# Patient Record
Sex: MALE | Race: WHITE | NOT HISPANIC OR LATINO | Employment: FULL TIME | URBAN - METROPOLITAN AREA
[De-identification: names, ages, dates, MRNs, and addresses within clinical notes are randomized per-mention and may not be internally consistent; named-entity substitution may affect disease eponyms.]

---

## 2019-03-12 ENCOUNTER — HOSPITAL ENCOUNTER (EMERGENCY)
Facility: CLINIC | Age: 39
Discharge: HOME | End: 2019-03-12
Attending: EMERGENCY MEDICINE | Admitting: EMERGENCY MEDICINE
Payer: COMMERCIAL

## 2019-03-12 ENCOUNTER — APPOINTMENT (OUTPATIENT)
Dept: CT IMAGING | Facility: CLINIC | Age: 39
End: 2019-03-12
Attending: EMERGENCY MEDICINE
Payer: COMMERCIAL

## 2019-03-12 VITALS
HEIGHT: 66 IN | WEIGHT: 135 LBS | OXYGEN SATURATION: 98 % | BODY MASS INDEX: 21.69 KG/M2 | SYSTOLIC BLOOD PRESSURE: 135 MMHG | DIASTOLIC BLOOD PRESSURE: 85 MMHG | TEMPERATURE: 98.6 F | HEART RATE: 66 BPM | RESPIRATION RATE: 16 BRPM

## 2019-03-12 DIAGNOSIS — R04.2 HEMOPTYSIS: ICD-10-CM

## 2019-03-12 LAB
ANION GAP SERPL CALCULATED.3IONS-SCNC: 7 MMOL/L (ref 3–14)
APTT PPP: 25 SEC (ref 22–37)
BASOPHILS # BLD AUTO: 0 10E9/L (ref 0–0.2)
BASOPHILS NFR BLD AUTO: 0.4 %
BUN SERPL-MCNC: 12 MG/DL (ref 7–30)
CALCIUM SERPL-MCNC: 9.2 MG/DL (ref 8.5–10.1)
CHLORIDE SERPL-SCNC: 104 MMOL/L (ref 94–109)
CO2 SERPL-SCNC: 26 MMOL/L (ref 20–32)
CREAT SERPL-MCNC: 0.91 MG/DL (ref 0.66–1.25)
CRP SERPL-MCNC: <2.9 MG/L (ref 0–8)
DIFFERENTIAL METHOD BLD: NORMAL
EOSINOPHIL # BLD AUTO: 0 10E9/L (ref 0–0.7)
EOSINOPHIL NFR BLD AUTO: 0.3 %
ERYTHROCYTE [DISTWIDTH] IN BLOOD BY AUTOMATED COUNT: 12.4 % (ref 10–15)
ERYTHROCYTE [SEDIMENTATION RATE] IN BLOOD BY WESTERGREN METHOD: 5 MM/H (ref 0–15)
GFR SERPL CREATININE-BSD FRML MDRD: >90 ML/MIN/{1.73_M2}
GLUCOSE SERPL-MCNC: 118 MG/DL (ref 70–99)
HCT VFR BLD AUTO: 48.3 % (ref 40–53)
HGB BLD-MCNC: 16 G/DL (ref 13.3–17.7)
IMM GRANULOCYTES # BLD: 0 10E9/L (ref 0–0.4)
IMM GRANULOCYTES NFR BLD: 0.1 %
INR PPP: 0.99 (ref 0.86–1.14)
LYMPHOCYTES # BLD AUTO: 1.3 10E9/L (ref 0.8–5.3)
LYMPHOCYTES NFR BLD AUTO: 16.5 %
MCH RBC QN AUTO: 30.5 PG (ref 26.5–33)
MCHC RBC AUTO-ENTMCNC: 33.1 G/DL (ref 31.5–36.5)
MCV RBC AUTO: 92 FL (ref 78–100)
MONOCYTES # BLD AUTO: 0.6 10E9/L (ref 0–1.3)
MONOCYTES NFR BLD AUTO: 7.9 %
NEUTROPHILS # BLD AUTO: 5.8 10E9/L (ref 1.6–8.3)
NEUTROPHILS NFR BLD AUTO: 74.8 %
NRBC # BLD AUTO: 0 10*3/UL
NRBC BLD AUTO-RTO: 0 /100
PLATELET # BLD AUTO: 254 10E9/L (ref 150–450)
POTASSIUM SERPL-SCNC: 3.9 MMOL/L (ref 3.4–5.3)
RBC # BLD AUTO: 5.25 10E12/L (ref 4.4–5.9)
SODIUM SERPL-SCNC: 137 MMOL/L (ref 133–144)
WBC # BLD AUTO: 7.7 10E9/L (ref 4–11)

## 2019-03-12 PROCEDURE — 85652 RBC SED RATE AUTOMATED: CPT | Performed by: EMERGENCY MEDICINE

## 2019-03-12 PROCEDURE — 25000128 H RX IP 250 OP 636: Performed by: EMERGENCY MEDICINE

## 2019-03-12 PROCEDURE — 99285 EMERGENCY DEPT VISIT HI MDM: CPT | Mod: 25

## 2019-03-12 PROCEDURE — 85610 PROTHROMBIN TIME: CPT | Performed by: EMERGENCY MEDICINE

## 2019-03-12 PROCEDURE — 85025 COMPLETE CBC W/AUTO DIFF WBC: CPT | Performed by: EMERGENCY MEDICINE

## 2019-03-12 PROCEDURE — 86140 C-REACTIVE PROTEIN: CPT | Performed by: EMERGENCY MEDICINE

## 2019-03-12 PROCEDURE — 71260 CT THORAX DX C+: CPT

## 2019-03-12 PROCEDURE — 80048 BASIC METABOLIC PNL TOTAL CA: CPT | Performed by: EMERGENCY MEDICINE

## 2019-03-12 PROCEDURE — 85730 THROMBOPLASTIN TIME PARTIAL: CPT | Performed by: EMERGENCY MEDICINE

## 2019-03-12 RX ORDER — IOPAMIDOL 755 MG/ML
500 INJECTION, SOLUTION INTRAVASCULAR ONCE
Status: COMPLETED | OUTPATIENT
Start: 2019-03-12 | End: 2019-03-12

## 2019-03-12 RX ADMIN — SODIUM CHLORIDE 74 ML: 9 INJECTION, SOLUTION INTRAVENOUS at 08:33

## 2019-03-12 RX ADMIN — IOPAMIDOL 57 ML: 755 INJECTION, SOLUTION INTRAVENOUS at 08:33

## 2019-03-12 ASSESSMENT — ENCOUNTER SYMPTOMS
RHINORRHEA: 0
FEVER: 0
SORE THROAT: 0
COUGH: 0

## 2019-03-12 ASSESSMENT — MIFFLIN-ST. JEOR: SCORE: 1465.11

## 2019-03-12 NOTE — ED TRIAGE NOTES
Patient presents with complaints of an episode of coughing up blood yesterday. Patient states he can feel the blood dripping down the back of this throat. Denies being ill recently.  ABC intact without need for intervention at this time.

## 2019-03-12 NOTE — ED PROVIDER NOTES
History     Chief Complaint:  Coughing up Blood    HPI   Leo Gabriel is an otherwise healthy 38 year old male who presents to the emergency department today for evaluation of hemoptysis. The patient reports he has been feeling at baseline throughout the day yesterday. While on his drive home from work, he coughed once and noticed non-mucousy dark bloody sputum. This has never happened to him before. After this cough, he started to experience light headedness and anxiety that he attributed to a panic attack due to the hemoptysis, but had no other symptoms. The hemoptysis then resolved throughout the night. 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 He has not run in high altitudes recently. This morning, he did notice mild blood from his right nare and feels as though he might be draining into his throat. He was concerned about the hemoptysis prompting his visit to the emergency department. He denies sinus congestion, cough, rhinorrhea, recent illness, previous lung or heart problems, history of blood clots, and family history of lung cancer.     Allergies:  Amoxicillin    Medications:    Allegra    Past Medical History:    History reviewed. No pertinent past medical history.    Past Surgical History:    History reviewed. No pertinent past surgical history.    Family History:    Hypertension    Social History:  The patient was accompanied to the ED by wife.  Smoking Status: Former, 1.00 ppd, 10 years, quit 2007  Smokeless Tobacco: Never  Alcohol Use: Yes, 3-6 cans/week  Drug Use: No  Marital Status:      Review of Systems   Constitutional: Negative for fever.   HENT: Negative for congestion, rhinorrhea and sore throat.         Hemoptysis   Respiratory: Negative for cough.    All other systems reviewed and are negative.        Physical Exam     Patient Vitals for the past 24 hrs:   BP Temp Temp src Heart Rate Resp SpO2 Height Weight   03/12/19 0721 (!) 144/95 98.6  F (37  C) Oral 71 16 97 % 1.676  "m (5' 6\") 61.2 kg (135 lb)         Physical Exam  Gen: well appearing, in no acute distress  HEENT:  mmm, no rhinorrhea  Neck: supple, no abnormal swelling  Lungs:  CTAB,  no resp distress  CV: rrr, no m/r/g, ppi  Abd: soft, nontender, nondistended, no rebound/masses/guarding/hsm  Ext: no peripheral edema  Skin: warm, dry, well perfused, no rashes/bruising/lesions on exposed skin  Neuro: alert, MAEE, no gross motor or sensory deficits, gait stable  Psych: Normal mood, normal affect      Emergency Department Course   Imaging:  Radiology findings were communicated with the patient and family who voiced understanding of the findings.  CT Chest Pulmonary Embolism w Contrast  IMPRESSION:   1. No evidence for pulmonary embolism or thoracic aortic dissection.    2. Patchy groundglass opacity at both lung bases, mild on the right  and minimal on the left. These findings are nonspecific but could be  related to infection or pulmonary hemorrhage.  Report per radiology     Laboratory:  Laboratory findings were communicated with the patient and family who voiced understanding of the findings.  CBC: WNL. (WBC 7.7, HGB 16.0, )   BMP: glucose 118 (H) o/w WNL (Creatinine 0.91)  INR: 0.99  PTT: 25    Emergency Department Course:  Nursing notes and vitals reviewed.  IV was inserted and blood was drawn for laboratory testing, results above.  The patient was sent for a CT Chest Pulmonary Embolism w Contrast while in the emergency department, results above.   0711: I performed an exam of the patient as documented above.   0914: Patient rechecked and updated.   0918: I spoke with Dr. Bennett of the pulmonology service from [facility] regarding patient's presentation, findings, and plan of care.  Findings and plan explained to the Patient and spouse. Patient discharged home with instructions regarding supportive care, medications, and reasons to return. The importance of close follow-up was reviewed.  I personally " reviewed the laboratory and imaging results with the Patient and spouse and answered all related questions prior to discharge.    Impression & Plan    Medical Decision Making:  Healthy 38 y M with no concerning cardiopulmonary hx here with hemoptysis.  Ddx: infectious, malignancy, pulm embolism, vasculitis vs blood from upper resp tract source.      CT showing groundglass opacities. Remainder of his blood work was unremarkable. Spoke with pulmonology at the [facility] about the differential and further work up for hemoptysis in the setting of groundglass opacities. He has no environmental exposures. He is not a drug user or huffer. He is not telling a convincing infectious or rheumatologic/vasculitis story. Given that he is clinically stable, does not have recurrent hemoptysis here in the emergency department, plan will be to discharge home alone to declare himself clinically to follow up with pulmonology as well have a Ct scan in a month to re-evaluate the groundglass opacities and return if new or worsening symptoms. Recommended that he go to the [facility] where they have pulmonology and can do bronchoscopy if further symptoms.     Diagnosis:    ICD-10-CM    1. Hemoptysis R04.2        Disposition:  discharged to home    Scribe Disclosure:  Everton VARELA, am serving as a scribe at 7:11 AM on 3/12/2019 to document services personally performed by Akbar Machado MD based on my observations and the provider's statements to me.     3/12/2019   [facility] EMERGENCY DEPARTMENT       Akbar Machado MD  03/12/19 1825

## 2019-03-12 NOTE — ED AVS SNAPSHOT
[facility] Emergency Department  [address]  [facility] [address]  Phone:  [phone]  Fax:  [phone]                                    Leo Gabriel   MRN: [id number]    Department:  [facility] Emergency Department   Date of Visit:  3/12/2019           After Visit Summary Signature Page    I have received my discharge instructions, and my questions have been answered. I have discussed any challenges I see with this plan with the nurse or doctor.    ..........................................................................................................................................  Patient/Patient Representative Signature      ..........................................................................................................................................  Patient Representative Print Name and Relationship to Patient    ..................................................               ................................................  Date                                   Time    ..........................................................................................................................................  Reviewed by Signature/Title    ...................................................              ..............................................  Date                                               Time          22EPIC Rev 08/18

## 2019-03-12 NOTE — DISCHARGE INSTRUCTIONS
You should have a repeat CT in one month with your primary care doctor    Return to ER ([facility]) immediately if you develop: worsening symptoms, Fever > 101, persistent nausea or vomiting OR you have any other concerns about your health.

## 2020-03-02 ENCOUNTER — HOSPITAL ENCOUNTER (EMERGENCY)
Facility: CLINIC | Age: 40
Discharge: HOME | End: 2020-03-03
Attending: PHYSICIAN ASSISTANT | Admitting: PHYSICIAN ASSISTANT
Payer: COMMERCIAL

## 2020-03-02 ENCOUNTER — APPOINTMENT (OUTPATIENT)
Dept: GENERAL RADIOLOGY | Facility: CLINIC | Age: 40
End: 2020-03-02
Attending: PHYSICIAN ASSISTANT
Payer: COMMERCIAL

## 2020-03-02 DIAGNOSIS — J18.9 PNEUMONIA OF RIGHT LUNG DUE TO INFECTIOUS ORGANISM, UNSPECIFIED PART OF LUNG: ICD-10-CM

## 2020-03-02 DIAGNOSIS — R04.2 HEMOPTYSIS: ICD-10-CM

## 2020-03-02 LAB
APTT PPP: 26 SEC (ref 22–37)
BASOPHILS # BLD AUTO: 0.1 10E9/L (ref 0–0.2)
BASOPHILS NFR BLD AUTO: 1.2 %
D DIMER PPP FEU-MCNC: <0.3 UG/ML FEU (ref 0–0.5)
DIFFERENTIAL METHOD BLD: NORMAL
EOSINOPHIL # BLD AUTO: 0.4 10E9/L (ref 0–0.7)
EOSINOPHIL NFR BLD AUTO: 5.2 %
ERYTHROCYTE [DISTWIDTH] IN BLOOD BY AUTOMATED COUNT: 12.1 % (ref 10–15)
HCT VFR BLD AUTO: 48 % (ref 40–53)
HGB BLD-MCNC: 15.7 G/DL (ref 13.3–17.7)
IMM GRANULOCYTES # BLD: 0 10E9/L (ref 0–0.4)
IMM GRANULOCYTES NFR BLD: 0.3 %
INR PPP: 0.98 (ref 0.86–1.14)
LYMPHOCYTES # BLD AUTO: 2 10E9/L (ref 0.8–5.3)
LYMPHOCYTES NFR BLD AUTO: 28.2 %
MCH RBC QN AUTO: 30.7 PG (ref 26.5–33)
MCHC RBC AUTO-ENTMCNC: 32.7 G/DL (ref 31.5–36.5)
MCV RBC AUTO: 94 FL (ref 78–100)
MONOCYTES # BLD AUTO: 0.6 10E9/L (ref 0–1.3)
MONOCYTES NFR BLD AUTO: 8.9 %
NEUTROPHILS # BLD AUTO: 3.9 10E9/L (ref 1.6–8.3)
NEUTROPHILS NFR BLD AUTO: 56.2 %
NRBC # BLD AUTO: 0 10*3/UL
NRBC BLD AUTO-RTO: 0 /100
PLATELET # BLD AUTO: 264 10E9/L (ref 150–450)
RBC # BLD AUTO: 5.12 10E12/L (ref 4.4–5.9)
WBC # BLD AUTO: 6.9 10E9/L (ref 4–11)

## 2020-03-02 PROCEDURE — 85610 PROTHROMBIN TIME: CPT | Performed by: PHYSICIAN ASSISTANT

## 2020-03-02 PROCEDURE — 80053 COMPREHEN METABOLIC PANEL: CPT | Performed by: PHYSICIAN ASSISTANT

## 2020-03-02 PROCEDURE — 85379 FIBRIN DEGRADATION QUANT: CPT | Performed by: PHYSICIAN ASSISTANT

## 2020-03-02 PROCEDURE — 85025 COMPLETE CBC W/AUTO DIFF WBC: CPT | Performed by: PHYSICIAN ASSISTANT

## 2020-03-02 PROCEDURE — 93005 ELECTROCARDIOGRAM TRACING: CPT

## 2020-03-02 PROCEDURE — 99285 EMERGENCY DEPT VISIT HI MDM: CPT | Mod: 25

## 2020-03-02 PROCEDURE — 85730 THROMBOPLASTIN TIME PARTIAL: CPT | Performed by: PHYSICIAN ASSISTANT

## 2020-03-02 PROCEDURE — 71046 X-RAY EXAM CHEST 2 VIEWS: CPT

## 2020-03-02 ASSESSMENT — ENCOUNTER SYMPTOMS
COUGH: 1
SORE THROAT: 1
FEVER: 0
SHORTNESS OF BREATH: 1
UNEXPECTED WEIGHT CHANGE: 0
DIAPHORESIS: 0
CHEST TIGHTNESS: 1

## 2020-03-02 NOTE — ED AVS SNAPSHOT
[facility] Emergency Department  [address]  [facility] [address]  Phone:  [phone]  Fax:  [phone]                                    Leo Gabriel   MRN: [id number]    Department:  [facility] Emergency Department   Date of Visit:  3/2/2020           After Visit Summary Signature Page    I have received my discharge instructions, and my questions have been answered. I have discussed any challenges I see with this plan with the nurse or doctor.    ..........................................................................................................................................  Patient/Patient Representative Signature      ..........................................................................................................................................  Patient Representative Print Name and Relationship to Patient    ..................................................               ................................................  Date                                   Time    ..........................................................................................................................................  Reviewed by Signature/Title    ...................................................              ..............................................  Date                                               Time          22EPIC Rev 08/18

## 2020-03-02 NOTE — LETTER
March 3, 2020      To Whom It May Concern:      Leo Gabriel was seen in our Emergency Department today, 03/03/20.  I expect his condition to improve over the next 2 days.  He may return to work/school when improved.    Sincerely,        Bipin Jade RN

## 2020-03-03 VITALS
SYSTOLIC BLOOD PRESSURE: 122 MMHG | HEART RATE: 62 BPM | RESPIRATION RATE: 16 BRPM | OXYGEN SATURATION: 96 % | DIASTOLIC BLOOD PRESSURE: 85 MMHG

## 2020-03-03 LAB
ALBUMIN SERPL-MCNC: 4.1 G/DL (ref 3.4–5)
ALP SERPL-CCNC: 64 U/L (ref 40–150)
ALT SERPL W P-5'-P-CCNC: 25 U/L (ref 0–70)
ANION GAP SERPL CALCULATED.3IONS-SCNC: 6 MMOL/L (ref 3–14)
AST SERPL W P-5'-P-CCNC: 21 U/L (ref 0–45)
BILIRUB SERPL-MCNC: 0.7 MG/DL (ref 0.2–1.3)
BUN SERPL-MCNC: 13 MG/DL (ref 7–30)
CALCIUM SERPL-MCNC: 9.1 MG/DL (ref 8.5–10.1)
CHLORIDE SERPL-SCNC: 104 MMOL/L (ref 94–109)
CO2 SERPL-SCNC: 26 MMOL/L (ref 20–32)
CREAT SERPL-MCNC: 0.98 MG/DL (ref 0.66–1.25)
GFR SERPL CREATININE-BSD FRML MDRD: >90 ML/MIN/{1.73_M2}
GLUCOSE SERPL-MCNC: 108 MG/DL (ref 70–99)
INTERPRETATION ECG - MUSE: NORMAL
POTASSIUM SERPL-SCNC: 3.6 MMOL/L (ref 3.4–5.3)
PROT SERPL-MCNC: 8 G/DL (ref 6.8–8.8)
SODIUM SERPL-SCNC: 136 MMOL/L (ref 133–144)

## 2020-03-03 PROCEDURE — 25000132 ZZH RX MED GY IP 250 OP 250 PS 637: Performed by: PHYSICIAN ASSISTANT

## 2020-03-03 RX ORDER — DOXYCYCLINE 100 MG/1
100 CAPSULE ORAL 2 TIMES DAILY
Qty: 15 CAPSULE | Refills: 0 | Status: SHIPPED | OUTPATIENT
Start: 2020-03-03 | End: 2020-04-16

## 2020-03-03 RX ORDER — DOXYCYCLINE 100 MG/1
100 CAPSULE ORAL ONCE
Status: COMPLETED | OUTPATIENT
Start: 2020-03-03 | End: 2020-03-03

## 2020-03-03 RX ADMIN — DOXYCYCLINE HYCLATE 100 MG: 100 CAPSULE ORAL at 01:36

## 2020-03-03 NOTE — ED PROVIDER NOTES
History     Chief Complaint:  Hemoptysis     HPI   Leo Gabriel is am otherwise healthy 39 year old male who presents to the emergency department for evaluation of hemoptysis. The patient reports about a year ago he had an episode of coughing up blood and was told to follow up with a pulmonologist if it happened again, but it did not reoccur so he did not follow up. He states about a month ago he had another episode of coughing up blood, and he saw an ENT and scheduled an appointment with pulmonology for this Wednesday. The patient indicates a similar episode of coughing up blood began on Saturday and has persisted intermittently, prompting him to present to the ED today. He describes the blood as dark red.  He reports it feels like blood may be running down the back of his throat as he also has a sore throat. He indicates he feels short of breath, which worsens at night, as well as occasional chest tightness. He denies chest pain, leg pain or swelling, fever, night sweats, and unexpected weight loss. The patient states he did vape prior to the first episode of coughing up blood a month ago, but he has not vaped since. He denies taking blood thinners. He further denies having ever travelled out of the [place]. He states he does not have a history of cancer or blood clots. The patient denies recent hospitalization, surgery, or other immobilization.  He is not on any immunosupressive medications.    Allergies:  Amoxicillin      Medications:    The patient is currently on no regular medications.     Past Medical History:    The patient denies any significant past medical history.     Past Surgical History:    Hernia repair     Family History:    HTN    Social History:  Presents alone.  Former smoker, 1 ppd, 10 pack years. Quit 5/1/2007.   Positive for alcohol use.    Positive for marijuana use.   Marital Status:   [2]     Review of Systems   Constitutional: Negative for diaphoresis, fever and unexpected weight  change.   HENT: Positive for sore throat.    Respiratory: Positive for cough (hemoptysis), chest tightness and shortness of breath.    Cardiovascular: Negative for chest pain and leg swelling.   All other systems reviewed and are negative.    Physical Exam     Patient Vitals for the past 24 hrs:   BP Pulse Heart Rate Resp SpO2   03/03/20 0045 -- -- -- -- 98 %   03/03/20 0030 (!) 124/90 57 -- -- 96 %   03/03/20 0010 -- -- -- -- 98 %   03/03/20 0000 129/83 69 -- -- 97 %   03/02/20 2340 (!) 140/95 -- -- -- 99 %   03/02/20 2335 (!) 140/95 65 -- -- 97 %   03/02/20 2203 (!) 158/108 -- 95 16 94 %      Physical Exam  General: Alert and cooperative with exam. Resting comfortably on gurney  Head:  Scalp is NC/AT  Eyes:  No scleral icterus, PERRL  ENT:  The external nose and ears are normal.     The oropharynx is normal and without erythema or tonsillar swelling. Uvula midline, no submandibular swelling, sublingual swelling, trismus.  No blood  Neck:  Normal range of motion without rigidity.  CV:  Regular rate and rhythm    No pathologic murmur, rubs, or gallops.  Resp:  Right basilar crackles.  No wheezes or rhonchi.  Non-labored, no retractions or accessory muscle use  GI:  Abdomen is soft, no distension, no tenderness, no masses. No peritoneal signs.  Bowel sounds present in all quadrants  MS:  No lower extremity edema or asymmetric calf swelling.  Skin:  Warm and dry, No rash or lesions noted.  Neuro: Oriented. No gross motor deficits.    Cranial nerves 2-12 intact. GCS: 15.  Psych: Awake. Alert. Normal affect. Appropriate interactions.     Emergency Department Course     ECG:  Time: 2317  Vent. Rate 61 bpm. NY interval 160. QRS duration 84. QT/QTc 396/398. P-R-T axis 34 49 46.  Normal sinus rhythm.  Normal ECG.  Read by Dr. Pinto at 2330     Imaging:  Radiographic findings were communicated with the patient who voiced understanding of the findings.    Chest XR, PA & LAT:  Heart is normal in size. Airspace opacification  right middle and right lower lobe. Findings suspicious for pneumonia. Minimal atelectasis left lower lung. Upper lungs clear. No pleural fluid. Mild hypertrophic change both AC joints.  As per radiology.    Laboratory:  CBC: WBC: 6.9, HGB: 15.7, PLT: 264     CMP: Glucose 108 (H), o/w WNL (Creatinine: 0.98)     D-dimer: <0.3    INR: 0.98    PTT: 26    Interventions:  0136 Doxycyline 100 mg per capsule 1 capsule PO    Emergency Department Course:  Past medical records, nursing notes, and vitals reviewed.    2220 I performed an exam of the patient as documented above.     IV was inserted and blood was drawn for laboratory testing, results above. Medication administered as noted above.     2317 EKG obtained in the ED, see results above.      The patient was sent for a Chest XR while in the emergency department, results above.     0110 I rechecked the patient and discussed the results of his workup thus far.     Findings and plan explained to the Patient. Patient discharged home with instructions regarding supportive care, medications, and reasons to return. The importance of close follow-up was reviewed. The patient was prescribed doxycycline.     I personally reviewed the laboratory results with the Patient and answered all related questions prior to discharge.      Impression & Plan      Medical Decision Makin year old male presents with hemoptysis.  He had a similar episode about 1 year ago which showed non-specific findings and advised to follow-up with pulmonology if recurred.  He has an appointment scheduled for 2 days from now.  Workup here shows right mid and lower opacities suspicious for pneumonia.  No indication for hospitalization by PSI score, no risk factors for resistent strains.  Will cover with doxycycline.  Labwork unremarkable with normal white count, hgb, platelets, coags.  He is vitally stable and no indication for hospitalization, transfusion or emergent bronchoscopy.  No further episodes of  hemoptysis here.  EKG normal.  Considered PE and D-dimer negative with no other risk factors.  No risk factors for TB and seems less likely given x-ray findings.  He already has follow-up scheduled in 2 days with pulmonolgy and urged him to keep this given the recurrent nature of this episode.  Strict return precautions given for new or worsening symptoms, increased hemoptysis, chest pain, shortness of breath, high fevers, etc.    Diagnosis:    ICD-10-CM   1. Pneumonia of right lung due to infectious organism, unspecified part of lung J18.9   2. Hemoptysis R04.2     Disposition:  discharged to home    Discharge Medications:  New Prescriptions    DOXYCYCLINE HYCLATE (VIBRAMYCIN) 100 MG CAPSULE    Take 1 capsule (100 mg) by mouth 2 times daily for 8 days     Scribe Disclosure:  IDestiny, am serving as a scribe on 3/2/2020 at 10:11 PM to personally document services performed by Norman Pearson PA-C, based on my observations and the provider's statements to me.      Destiny Foreman  3/2/2020   [facility] EMERGENCY DEPARTMENT       Norman Pearson PA-C  03/03/20 9124

## 2020-03-03 NOTE — ED TRIAGE NOTES
Pt here with c/o cough up blood since Sat. Similar epsidoe happened about year ago. Saw ENT, told to follow up with pulmonology if happens again. Has appointment with pulmonology on Wednesday. But now feeling SOB, worse at night. No chills/fevers. No body aches or weight loss. No recent travel. Not on blood thinners. ABC intact.

## 2020-04-16 ENCOUNTER — VIRTUAL VISIT (OUTPATIENT)
Dept: FAMILY MEDICINE | Facility: CLINIC | Age: 40
End: 2020-04-16
Payer: COMMERCIAL

## 2020-04-16 ENCOUNTER — ANCILLARY PROCEDURE (OUTPATIENT)
Dept: GENERAL RADIOLOGY | Facility: CLINIC | Age: 40
End: 2020-04-16
Attending: PHYSICIAN ASSISTANT
Payer: COMMERCIAL

## 2020-04-16 ENCOUNTER — OFFICE VISIT (OUTPATIENT)
Dept: FAMILY MEDICINE | Facility: CLINIC | Age: 40
End: 2020-04-16
Payer: COMMERCIAL

## 2020-04-16 VITALS
SYSTOLIC BLOOD PRESSURE: 130 MMHG | HEART RATE: 76 BPM | DIASTOLIC BLOOD PRESSURE: 75 MMHG | TEMPERATURE: 99.1 F | OXYGEN SATURATION: 97 %

## 2020-04-16 DIAGNOSIS — R04.2 HEMOPTYSIS: ICD-10-CM

## 2020-04-16 DIAGNOSIS — R05.9 COUGH: ICD-10-CM

## 2020-04-16 DIAGNOSIS — R04.2 HEMOPTYSIS: Primary | ICD-10-CM

## 2020-04-16 DIAGNOSIS — R05.9 COUGH: Primary | ICD-10-CM

## 2020-04-16 DIAGNOSIS — K21.9 GASTROESOPHAGEAL REFLUX DISEASE WITHOUT ESOPHAGITIS: ICD-10-CM

## 2020-04-16 PROCEDURE — 71046 X-RAY EXAM CHEST 2 VIEWS: CPT

## 2020-04-16 PROCEDURE — 99204 OFFICE O/P NEW MOD 45 MIN: CPT | Performed by: PHYSICIAN ASSISTANT

## 2020-04-16 PROCEDURE — 99207 ZZC NO BILLABLE SERVICE THIS VISIT: CPT | Performed by: PHYSICIAN ASSISTANT

## 2020-04-16 NOTE — PROGRESS NOTES
Subjective     Leo Gabriel is a 39 year old male who presents to clinic today for the following health issues:    HPI   Acute Illness   Acute illness concerns: Cough  Onset: Yesterday (but diagnosed with pneumonia previously about a month ago)    Fever: no    Chills/Sweats: no    Conjunctivitis:  no    Ear Pain: no    Rhinorrhea: no    Congestion: no    Sore Throat: YES- scratchy throat     Cough: YES - taste of blood occurred last night    Wheeze: no    Decreased Appetite: no    Nausea: no    Vomiting: no    Diarrhea:  no    Dysuria/Freq.: no    Fatigue/Achiness: YES- fatigue    Sick/Strep Exposure: no       Landon is a 39 year old male who presents today for evaluation of on going cough and hemoptysis. The patient had some hemoptysis in January and then again in February/March. That was when he was diagnosed with pneumonia. At that time he had fever, chills and cough. He was treated with doxycycline.    Patient used to smoke but quit in 2007.    He has some reflux. Not currently using anything for symptoms. He drinks coffee and 2 beers daily.    Patient has seen both ENT and pulmonology about this hemoptysis. ENT did a scope and did not see any cause for the blood. It was thought the blood could be due to the pneumonia when he saw pulmonology. At CT was performed by the pulmonologist and per patient did not show any changes compared to the x-ray done in the ER.    No current outpatient medications on file.     No current facility-administered medications for this visit.        Allergies   Allergen Reactions     Amoxicillin Difficulty breathing and Rash       Reviewed and updated as needed this visit by Provider  Tobacco  Allergies  Meds  Problems  Soc Hx        Review of Systems   GENERAL:  No fevers  RESP:  As noted in HPI        Objective    /75 (BP Location: Left arm, Patient Position: Sitting, Cuff Size: Adult Regular)   Pulse 76   Temp 99.1  F (37.3  C) (Oral)   SpO2 97%   There is no height or  weight on file to calculate BMI.  Physical Exam   GENERAL: No acute distress  HEENT: Normocephalic, PERRL, Posterior oropharynx non-erythematous and without exudate.  NECK: No cervical or supraclavicular lymphadenopathy.  CARDIAC: Regular rate and rhythm. No murmurs.  PULMONARY: Lungs are clear to auscultation bilaterally. No wheezes, rhonchi or crackles.  NEURO: Alert and non-focal    Diagnostic Test Results:  CHEST TWO VIEWS 4/16/2020 1:15 PM      HISTORY: Right middle and lower lobe pneumonia 3/2/20, cough  continues, recent worsening; Cough     COMPARISON: 3/2/2020                                                                       IMPRESSION: The previously identified right basilar opacity has  resolved. No new airspace opacities are identified. No pleural fluid  or pneumothorax. Normal size of the heart.        Assessment & Plan     1. Cough  Due to patient's ongoing cough as well as his hemoptysis chest x-ray was repeated today.  This shows a resolution of the pneumonia seen previously.  At this time the etiology of this red sputum and metallic taste is unclear.  It is possibly related to reflux which the patient does admit to having.  He has not treated this with any medications as of yet.  Patient has seen ENT as well as pulmonology without any obvious etiology for his bleeding other than the pneumonia that was diagnosed a month ago.  At this time referral to [facility] for endoscopy was placed.  Patient also placed on omeprazole as noted below.  We discussed other lifestyle changes to improve reflux including decreasing his coffee intake as well as decreasing his alcohol intake.  Patient does not routinely take NSAIDs.  - XR Chest 2 Views; Future    2. Gastroesophageal reflux disease without esophagitis  As noted above.  - GASTROENTEROLOGY ADULT REF PROCEDURE ONLY Other; [facility] [phone]; Future  - omeprazole (PRILOSEC) 20 MG DR capsule; Take 1 capsule (20 mg) by mouth daily  Dispense: 30 capsule;  Refill: 1    3. Hemoptysis  As noted above.  - GASTROENTEROLOGY ADULT REF PROCEDURE ONLY Other; [facility] [phone]; Future       See Patient Instructions    Return if symptoms worsen or fail to improve.    Tracy Hartmann PA-C  [facility]

## 2020-04-16 NOTE — PATIENT INSTRUCTIONS
Follow up with [facility].  Start the omeprazole.    Patient Education     GERD (Adult)  The esophagus is a tube that carries food from the mouth to the stomach. A valve (the LES, lower esophageal sphincter) at the lower end of the esophagus prevents stomach acid from flowing upward. When this valve doesn't work properly, stomach contents may repeatedly flow back up (reflux) into the esophagus. This is called gastroesophageal reflux disease (GERD). GERD can irritate the esophagus. It can cause problems with pain, swallowing or breathing. In severe cases, GERD can cause recurrent pneumonia (from aspiration or breathing in particles) or other serious problems.  Symptoms of reflux include burning, pressure or sharp pain in the upper abdomen or mid to lower chest. The pain can spread to the neck, back, or shoulder. There may be belching, an acid taste in the back of the throat, chronic cough, or sore throat, or hoarseness. GERD symptoms often occur during the day after a big meal. They can also occur at night when lying down.   Home care  Lifestyle changes can help reduce symptoms. If needed, your healthcare provider may prescribe medicines. Symptoms often improve with treatment, but if treatment is stopped, the symptoms often return after a few months. So most persons with GERD will need to continue treatment or get treatment on and off.  Lifestyle changes    Limit or avoid fatty, fried, and spicy foods, as well as coffee, chocolate, mint, and foods with high acid content such as tomatoes and citrus fruit and juices (orange, grapefruit, lemon).    Don t eat large meals, especially at night. Frequent, smaller meals are best. Don't lie down right after eating. And don t eat anything 3 hours before going to bed.    Don't drink alcohol or smoke. As much as possible, stay away from second hand smoke.    If you are overweight, losing weight will reduce symptoms.     Don't wear tight clothing around your stomach area.    If your  "symptoms occur during sleep, use a foam wedge to elevate your upper body (not just your head.) Or, place 4\" blocks under the head of your bed. Or use 2 bed risers under your bedframe.  Medicines  If needed, medicines can help relieve the symptoms of GERD and prevent damage to the esophagus. Discuss a medicine plan with your healthcare provider. This may include one or more of the following medicines:    Antacids to help neutralize the normal acids in your stomach.    Acid blockers (Histamine or H2 blockers) to decrease acid production.    Acid inhibitors (proton pump inhibitors PPIs) to decrease acid production in a different way than the blockers. They may work better, but can take a little longer to take effect.  Take an antacid 30 to 60 minutes after eating and at bedtime, but not at the same time as an acid blocker.  Try not to take medicines such as ibuprofen and aspirin. If you are taking aspirin for your heart or other medical reasons, talk to your healthcare provider about stopping it.  Follow-up care  Follow up with your healthcare provider or as advised by our staff.  When to seek medical advice  Call your healthcare provider if any of the following occur:    Stomach pain gets worse or moves to the lower right abdomen (appendix area)    Chest pain appears or gets worse, or spreads to the back, neck, shoulder, or arm    An over-the-counter trial of medicine doesn't relieve your symptoms    Weight loss that can't be explained    Trouble or pain swallowing    Frequent vomiting (can t keep down liquids)    Blood in the stool or vomit (red or black in color)    Feeling weak or dizzy    Fever of 100.4 F (38 C) or higher, or as directed by your healthcare provider  Date Last Reviewed: 3/1/2018    9474-8727 [employer]. [address] [place] [address]. All rights reserved. This information is not intended as a substitute for professional medical care. Always follow your healthcare " professional's instructions.

## 2020-04-16 NOTE — PROGRESS NOTES
"Leo Gabriel is a 39 year old male who is being evaluated via a billable video visit.      The patient has been notified of following:     \"This video visit will be conducted via a call between you and your physician/provider. We have found that certain health care needs can be provided without the need for an in-person physical exam.  This service lets us provide the care you need with a video conversation.  If a prescription is necessary we can send it directly to your pharmacy.  If lab work is needed we can place an order for that and you can then stop by our lab to have the test done at a later time.    Video visits are billed at different rates depending on your insurance coverage.  Please reach out to your insurance provider with any questions.    If during the course of the call the physician/provider feels a video visit is not appropriate, you will not be charged for this service.\"    Patient has given verbal consent for Video visit? Yes    How would you like to obtain your AVS? E-Mail (inform patient AVS not encrypted)    Patient would like the video invitation sent by: Text to cell phone: [phone]      Subjective     Leo Gabriel is a 39 year old male who presents to clinic today for the following health issues:    Acute Illness   Acute illness concerns: cough  Onset: last night    Fever: no    Chills/Sweats: no    Headache (location?): no    Sinus Pressure:no    Conjunctivitis:  no    Ear Pain: no    Rhinorrhea: no     Congestion: YES    Sore Throat: YES- drainage and itchy throat      Cough: YES-{cough up blood last night     Wheeze: no     Decreased Appetite: no    Nausea: no    Vomiting: no    Diarrhea:  no    Dysuria/Freq.: no    Fatigue/Achiness: no    Sick/Strep Exposure: no      Therapies Tried and outcome:        Video Start Time: 1117        Patient was at ER 1 month ago. Dx with pneumonia, given Rx for doxycycline and then saw Dr. Peters (pulmnology) 1 week later with a CT of the chest. " "Per patient, Dr. Peters \"didn't see anything new\". Patient states he did get Rx for prednisone taper from pulmonology. Patient states his symptoms improved and then ended up with dry cough, but over the past 24 hours coughed up\"what tasted like blood\" overnight. Patient having reflux daily and worried \"blood is coming from my GI tract\".     Reviewed and updated as needed this visit by Provider         Review of Systems   ROS COMP: Constitutional, HEENT, cardiovascular, pulmonary, gi and gu systems are negative, except as otherwise noted.      Objective    There were no vitals taken for this visit.  Estimated body mass index is 21.79 kg/m  as calculated from the following:    Height as of 3/12/19: 1.676 m (5' 6\").    Weight as of 3/12/19: 61.2 kg (135 lb).  Physical Exam   GENERAL: healthy, alert and no distress            Assessment & Plan     1. Hemoptysis  Will bring in to clinic for exam.   Approved by same day provider.              No follow-ups on file.    Yarelis Kline PA-C  [facility]      Video-Visit Details    Type of service:  Video Visit    Video End Time (time video stopped): 1128    Originating Location (pt. Location): Home    Distant Location (provider location):  [facility]     Mode of Communication:  Video Conference via [employer]    No follow-ups on file.       Yarelis Kline PA-C      "

## 2020-05-06 ENCOUNTER — TRANSFERRED RECORDS (OUTPATIENT)
Dept: HEALTH INFORMATION MANAGEMENT | Facility: CLINIC | Age: 40
End: 2020-05-06

## 2020-05-12 ENCOUNTER — TRANSFERRED RECORDS (OUTPATIENT)
Dept: HEALTH INFORMATION MANAGEMENT | Facility: CLINIC | Age: 40
End: 2020-05-12

## 2020-10-05 ENCOUNTER — VIRTUAL VISIT (OUTPATIENT)
Dept: FAMILY MEDICINE | Facility: CLINIC | Age: 40
End: 2020-10-05
Payer: COMMERCIAL

## 2020-10-05 DIAGNOSIS — J30.2 SEASONAL ALLERGIC RHINITIS, UNSPECIFIED TRIGGER: Primary | ICD-10-CM

## 2020-10-05 PROCEDURE — 99213 OFFICE O/P EST LOW 20 MIN: CPT | Mod: 95 | Performed by: FAMILY MEDICINE

## 2020-10-05 RX ORDER — MONTELUKAST SODIUM 10 MG/1
10 TABLET ORAL AT BEDTIME
Qty: 30 TABLET | Refills: 11 | Status: SHIPPED | OUTPATIENT
Start: 2020-10-05 | End: 2022-11-09

## 2020-10-05 NOTE — PROGRESS NOTES
"Leo Gabriel is a 40 year old male who is being evaluated via a billable video visit.      The patient has been notified of following:     \"This video visit will be conducted via a call between you and your physician/provider. We have found that certain health care needs can be provided without the need for an in-person physical exam.  This service lets us provide the care you need with a video conversation.  If a prescription is necessary we can send it directly to your pharmacy.  If lab work is needed we can place an order for that and you can then stop by our lab to have the test done at a later time.    Video visits are billed at different rates depending on your insurance coverage.  Please reach out to your insurance provider with any questions.    If during the course of the call the physician/provider feels a video visit is not appropriate, you will not be charged for this service.\"    Patient has given verbal consent for Video visit? Yes  How would you like to obtain your AVS? MyChart  If you are dropped from the video visit, the video invite should be resent to: Text to cell phone: [phone]  Will anyone else be joining your video visit? No    Subjective     Leo Gabriel is a 40 year old male who presents today via video visit for the following health issues:    HPI     ALLERGIES  Onset/Duration: 6 months  Symptoms:   Nasal congestion: no  Sneezing: YES  Red, itchy eyes: no  Progression of Symptoms: intermittent  Accompanying Signs & Symptoms:  Cough: YES- feels need to clear throat  Wheezing: no  Rash: no  Sinus/facial pain: no  History:   Is it seasonal: during any time of the year   History of Asthma: no  Has allergy testing been done: no  Precipitating factors:   None  Alleviating factors:  None  Therapies tried and outcome: OTC allergy medication, flonase    Video Start Time: 1:45 PM    Patient is seen for video visit to discuss treatment of seasonal nasal allergies.    He has tried multiple OTC " "oral antihistamine to include levocetirizine, and loratadine, without relief, and he got systemic hives using Flonase after 2 weeks.    He wakes in the morning with nasal congestion.  He has chronic throat irritation, and he does not think his throat irritation is related to GERD, having had an acid reflux workup.  He believes it is likely due to postnasal drainage.    No recent cough or shortness of breath.  No recent fever.    Nasal congestion is worse during non-Winter months.       Review of Systems   See history of present illness.      Objective    Vitals - Patient Reported  Weight (Patient Reported): 59 kg (130 lb)  Height (Patient Reported): 167.6 cm (5' 6\")  BMI (Based on Pt Reported Ht/Wt): 20.98  Pulse (Patient Reported): 68      Vitals:  No vitals were obtained today due to virtual visit.    Physical Exam     GENERAL: Healthy, alert and no distress  EYES: Eyes grossly normal to inspection.  No discharge or erythema, or obvious scleral/conjunctival abnormalities.  RESP: No audible wheeze, cough, or visible cyanosis.  No visible retractions or increased work of breathing.    SKIN: Visible skin clear. No significant rash, abnormal pigmentation or lesions.  NEURO: Cranial nerves grossly intact.  Mentation and speech appropriate for age.  PSYCH: Mentation appears normal, affect normal/bright, judgement and insight intact, normal speech and appearance well-groomed.    Patient was agreeable to my suggestion that we try treatment using montelukast.          Assessment & Plan     Seasonal allergic rhinitis, unspecified trigger    - montelukast (SINGULAIR) 10 MG tablet  Dispense: 30 tablet; Refill: 11          There are no Patient Instructions on file for this visit.    No follow-ups on file.    Michael James DO  [facility]      Video-Visit Details    Type of service:  Video Visit    Video End Time:1:55 PM    Originating Location (pt. Location): Home    Distant Location (provider " location):  [facility]     Platform used for Video Visit: [employer]

## 2020-10-06 ENCOUNTER — OFFICE VISIT (OUTPATIENT)
Dept: FAMILY MEDICINE | Facility: CLINIC | Age: 40
End: 2020-10-06
Payer: COMMERCIAL

## 2020-10-06 VITALS
TEMPERATURE: 98.2 F | DIASTOLIC BLOOD PRESSURE: 85 MMHG | WEIGHT: 134.3 LBS | SYSTOLIC BLOOD PRESSURE: 132 MMHG | HEART RATE: 57 BPM | BODY MASS INDEX: 21.68 KG/M2 | OXYGEN SATURATION: 99 %

## 2020-10-06 DIAGNOSIS — R05.9 COUGH: Primary | ICD-10-CM

## 2020-10-06 DIAGNOSIS — Z23 NEED FOR PROPHYLACTIC VACCINATION AND INOCULATION AGAINST INFLUENZA: ICD-10-CM

## 2020-10-06 DIAGNOSIS — R04.2 HEMOPTYSIS: ICD-10-CM

## 2020-10-06 PROCEDURE — 99213 OFFICE O/P EST LOW 20 MIN: CPT | Mod: 25 | Performed by: PHYSICIAN ASSISTANT

## 2020-10-06 PROCEDURE — 90471 IMMUNIZATION ADMIN: CPT | Performed by: PHYSICIAN ASSISTANT

## 2020-10-06 PROCEDURE — 90686 IIV4 VACC NO PRSV 0.5 ML IM: CPT | Performed by: PHYSICIAN ASSISTANT

## 2020-10-06 RX ORDER — IPRATROPIUM BROMIDE 21 UG/1
1-2 SPRAY, METERED NASAL 3 TIMES DAILY PRN
Qty: 30 ML | Refills: 3 | Status: SHIPPED | OUTPATIENT
Start: 2020-10-06 | End: 2022-11-09

## 2020-10-06 NOTE — PROGRESS NOTES
Subjective     Leo Gabriel is a 40 year old male who presents to clinic today for the following health issues:    HPI        ALLERGIES  Onset/Duration: 6 months  Symptoms:   Nasal congestion: no  Sneezing: YES  Red, itchy eyes: no  Progression of Symptoms: intermittent  Accompanying Signs & Symptoms:  Cough: YES- feels need to clear throat, noticed blood this  morning  Wheezing: no  Rash: no  Sinus/facial pain: no  History:   Is it seasonal: during any time of the year   History of Asthma: no  Has allergy testing been done: no  Precipitating factors:   None  Alleviating factors:  None  Therapies tried and outcome: OTC allergy medication, flonase     {SUPERLIST (Optional):582289}  {additonal problems for provider to add (Optional):324182}    Review of Systems   {ROS COMP (Optional):963262}      Objective    There were no vitals taken for this visit.  There is no height or weight on file to calculate BMI.  Physical Exam   {Exam List (Optional):070177}    {Diagnostic Test Results (Optional):962724}        {PROVIDER CHARTING PREFERENCE:463696}

## 2020-10-06 NOTE — PROGRESS NOTES
Subjective     Leo Gabriel is a 40 year old male who presents to clinic today for the following health issues:    URI             Acute Illness  Acute illness concerns: cough  Onset/Duration: 2 years  Symptoms:  Fever: no  Chills/Sweats: no  Headache (location?): no  Sinus Pressure: no  Conjunctivitis:  no  Ear Pain: no  Rhinorrhea: YES- sometimes  Congestion: no  Sore Throat: no  Cough: YES - throat clearing   Wheeze: no  Decreased Appetite: no  Nausea: no  Vomiting: no  Diarrhea: no  Dysuria/Freq.: no  Dysuria or Hematuria: no  Fatigue/Achiness: no  Sick/Strep Exposure: no  Therapies tried and outcome: benadryl, claritin, and other otc    Ongoing. Intermittent blood in sputum in AM. Present this morning. Concerned side effect of singulair.     Of note, has seen pulm , ENT, and GI in the past for etiology. No cause. Reported CT can in past normal. Patient attempted flonase in the past but stopped due to hives after 2 weeks.     Of note, patient states symptoms started shortly after taking a hit on a vape pen years ago. At pul, patient denies any pft testing. Past history of smoking, has since stopped.     No tb exposures. No weight loss or night sweats.     Review of Systems   Constitutional, HEENT, cardiovascular, pulmonary, GI, , musculoskeletal, neuro, skin, endocrine and psych systems are negative, except as otherwise noted.      Objective    /85 (BP Location: Right arm, Patient Position: Chair, Cuff Size: Adult Regular)   Pulse 57   Temp 98.2  F (36.8  C) (Oral)   Wt 60.9 kg (134 lb 4.8 oz)   SpO2 99%   BMI 21.68 kg/m    Body mass index is 21.68 kg/m .  Physical Exam   GENERAL: healthy, alert and no distress  EYES: Eyes grossly normal to inspection, PERRL and conjunctivae and sclerae normal  HENT: ear canals and TM's normal, nose and mouth without ulcers or lesions  RESP: lungs clear to auscultation - no rales, rhonchi or wheezes  CV: regular rates and rhythm, normal S1 S2, no S3 or S4 and no  murmur, click or rub  PSYCH: mentation appears normal, affect normal/bright        Assessment & Plan     Cough  Ongoing and chronic. Based on history and negative work up, sounds as post nasal drip. Allergies are possible. Recommending continuing singulair. However, given hemoptysis and occurrence after vaping, recommending follow up with pulm and possible pfts as well. atrovent prn to be attempted as well.   - ipratropium (ATROVENT) 0.03 % nasal spray; Spray 1-2 sprays into both nostrils 3 times daily as needed for rhinitis  - PULMONARY MEDICINE REFERRAL    Hemoptysis  As above       Need for prophylactic vaccination and inoculation against influenza    - INFLUENZA VACCINE IM > 6 MONTHS VALENT IIV4 [26734]  - Vaccine Administration, Initial [60891]          Return in about 6 months (around 4/6/2021) for Physical Exam, with pcp.    Maxime Rojo PA-C  [facility]

## 2020-10-06 NOTE — PATIENT INSTRUCTIONS
Patient Education     Chronic Cough with Uncertain Cause (Adult)    Everyone has had a cough as part of the common cold, flu, or bronchitis. This kind of cough occurs along with an achy feeling, low-grade fever, nasal and sinus congestion, and a scratchy or sore throat. This usually gets better in 2 to 3 weeks. A cough that lasts longer than 3 weeks may be due to other causes. Your healthcare provider may refer to this as a chronic cough.  If your cough does not improve over the next 2 weeks, further testing may be needed. Follow up with your healthcare provider as advised. Cough suppressants may be recommended. Based on your exam today, the exact cause of your cough is not certain. Below are some common causes for persistent cough.  Smoker's cough  Smoker s cough doesn t go away. If you continue to smoke, it only gets worse. The cough is from irritation in the air passages. Talk to your healthcare provider about quitting. Medicines or nicotine-replacement products, like gum or the patch, may make quitting easier.  Postnasal drip  A cough that is worse at night may be due to postnasal drip. Excess mucus in the nose drains from the back of your nose to your throat. This triggers the cough reflex. Postnasal drip may be due to a sinus infection or allergy. Common allergens include dust, tobacco smoke (both inhaled and secondhand smoke), environmental pollutants, pollen, mold, pets, cleaning agents, room deodorizers, and chemical fumes. Over-the-counter antihistamines or decongestants may be helpful for allergies. A sinus infection may requires antibiotic treatment. See your healthcare provider if symptoms continue.  Medicines  Certain prescribed medicines can cause a chronic cough in some people:    ACE inhibitors for high blood pressure. These include benazepril, captopril, enalapril, fosinopril, lisinopril, quinapril, ramipril, and others.    Beta-blockers for high blood pressure and other conditions. These include  propranolol, atenolol, metoprolol, nadolol, and others.  Let your healthcare provider know if you are taking any of these. The chronic cough may mean your medicine needs to be changed.  Asthma  Cough may be the only sign of mild asthma. You may have tests to find out if asthma is causing your cough. You may also take asthma medicine on a trial basis.  Acid reflux (heartburn, GERD)  The esophagus is the tube that carries food from the mouth to the stomach. A valve at its lower end prevents stomach acids from flowing upward. If this valve does not work properly, acid from the stomach enters the esophagus. This may cause a burning pain in the upper abdomen or lower chest, belching, or cough. Symptoms are often worse when lying flat. Avoid eating or drinking before bedtime. Try using extra pillows to raise your upper body, or place 4-inch blocks under the head of your bed. You may try an over-the-counter (OTC) antacid or an acid-blocking medicine such as famotidine, cimetidine, ranitidine, esomeprazole, lansoprazole, or omeprazole. Stronger medicines for this condition can be prescribed by your healthcare provider. Ask your healthcare provider which OTC medicine to use. Depending on your current medicines, some OTC medicines may cause drug interactions and should be avoided.  Follow-up care  Follow up with your healthcare provider, or as advised, if your cough does not improve. Further testing may be needed.  Note: If an X-ray was taken, a specialist will review it. You will be notified of any new findings that may affect your care.  When to seek medical advice  Call your healthcare provider right away if any of these occur:    Mild wheezing or difficulty breathing    Fever of 100.4 F (38 C) or higher, or as directed by your healthcare provider    Unexpected weight loss    Coughing up large amounts of colored sputum or blood-tinged sputum    Night sweats (sheets and pajamas get soaking wet)  Call 911  Call 911 if any of  these occur:    Coughing up blood    Moderate to severe trouble breathing or wheezing  Date Last Reviewed: 6/1/2018 2000-2019 [employer]. [address] [place] [address], [place] [address]. All rights reserved. This information is not intended as a substitute for professional medical care. Always follow your healthcare professional's instructions.

## 2020-10-07 ENCOUNTER — HOSPITAL ENCOUNTER (EMERGENCY)
Facility: CLINIC | Age: 40
Discharge: HOME | End: 2020-10-07
Attending: EMERGENCY MEDICINE | Admitting: EMERGENCY MEDICINE
Payer: COMMERCIAL

## 2020-10-07 ENCOUNTER — APPOINTMENT (OUTPATIENT)
Dept: GENERAL RADIOLOGY | Facility: CLINIC | Age: 40
End: 2020-10-07
Attending: EMERGENCY MEDICINE
Payer: COMMERCIAL

## 2020-10-07 VITALS
RESPIRATION RATE: 20 BRPM | SYSTOLIC BLOOD PRESSURE: 145 MMHG | OXYGEN SATURATION: 100 % | WEIGHT: 135 LBS | TEMPERATURE: 98.9 F | DIASTOLIC BLOOD PRESSURE: 107 MMHG | BODY MASS INDEX: 21.79 KG/M2 | HEART RATE: 59 BPM

## 2020-10-07 DIAGNOSIS — R04.2 HEMOPTYSIS: ICD-10-CM

## 2020-10-07 LAB
ALBUMIN SERPL-MCNC: 3.8 G/DL (ref 3.4–5)
ALP SERPL-CCNC: 57 U/L (ref 40–150)
ALT SERPL W P-5'-P-CCNC: 21 U/L (ref 0–70)
ANION GAP SERPL CALCULATED.3IONS-SCNC: 5 MMOL/L (ref 3–14)
AST SERPL W P-5'-P-CCNC: 17 U/L (ref 0–45)
BASOPHILS # BLD AUTO: 0.1 10E9/L (ref 0–0.2)
BASOPHILS NFR BLD AUTO: 1 %
BILIRUB SERPL-MCNC: 0.4 MG/DL (ref 0.2–1.3)
BUN SERPL-MCNC: 10 MG/DL (ref 7–30)
CALCIUM SERPL-MCNC: 8.7 MG/DL (ref 8.5–10.1)
CHLORIDE SERPL-SCNC: 108 MMOL/L (ref 94–109)
CO2 SERPL-SCNC: 28 MMOL/L (ref 20–32)
CREAT SERPL-MCNC: 1.05 MG/DL (ref 0.66–1.25)
DIFFERENTIAL METHOD BLD: NORMAL
EOSINOPHIL # BLD AUTO: 0.1 10E9/L (ref 0–0.7)
EOSINOPHIL NFR BLD AUTO: 1.2 %
ERYTHROCYTE [DISTWIDTH] IN BLOOD BY AUTOMATED COUNT: 12.1 % (ref 10–15)
GFR SERPL CREATININE-BSD FRML MDRD: 87 ML/MIN/{1.73_M2}
GLUCOSE SERPL-MCNC: 95 MG/DL (ref 70–99)
HCT VFR BLD AUTO: 46.6 % (ref 40–53)
HGB BLD-MCNC: 14.9 G/DL (ref 13.3–17.7)
IMM GRANULOCYTES # BLD: 0 10E9/L (ref 0–0.4)
IMM GRANULOCYTES NFR BLD: 0.2 %
INR PPP: 0.94 (ref 0.86–1.14)
LYMPHOCYTES # BLD AUTO: 1.2 10E9/L (ref 0.8–5.3)
LYMPHOCYTES NFR BLD AUTO: 25.5 %
MCH RBC QN AUTO: 30 PG (ref 26.5–33)
MCHC RBC AUTO-ENTMCNC: 32 G/DL (ref 31.5–36.5)
MCV RBC AUTO: 94 FL (ref 78–100)
MONOCYTES # BLD AUTO: 0.5 10E9/L (ref 0–1.3)
MONOCYTES NFR BLD AUTO: 10.7 %
NEUTROPHILS # BLD AUTO: 3 10E9/L (ref 1.6–8.3)
NEUTROPHILS NFR BLD AUTO: 61.4 %
NRBC # BLD AUTO: 0 10*3/UL
NRBC BLD AUTO-RTO: 0 /100
PLATELET # BLD AUTO: 205 10E9/L (ref 150–450)
POTASSIUM SERPL-SCNC: 3.5 MMOL/L (ref 3.4–5.3)
PROT SERPL-MCNC: 7.1 G/DL (ref 6.8–8.8)
RBC # BLD AUTO: 4.96 10E12/L (ref 4.4–5.9)
SODIUM SERPL-SCNC: 141 MMOL/L (ref 133–144)
WBC # BLD AUTO: 4.9 10E9/L (ref 4–11)

## 2020-10-07 PROCEDURE — 80053 COMPREHEN METABOLIC PANEL: CPT | Performed by: EMERGENCY MEDICINE

## 2020-10-07 PROCEDURE — 85025 COMPLETE CBC W/AUTO DIFF WBC: CPT | Performed by: EMERGENCY MEDICINE

## 2020-10-07 PROCEDURE — 99284 EMERGENCY DEPT VISIT MOD MDM: CPT | Mod: 25

## 2020-10-07 PROCEDURE — 71046 X-RAY EXAM CHEST 2 VIEWS: CPT

## 2020-10-07 PROCEDURE — 85610 PROTHROMBIN TIME: CPT | Performed by: EMERGENCY MEDICINE

## 2020-10-07 NOTE — ED AVS SNAPSHOT
[facility]  [address]  [facility] [address]  Phone: [phone]  Fax: [phone]                                    Leo Gabriel   MRN: [id number]    Department: [facility]   Date of Visit: 10/7/2020           After Visit Summary Signature Page    I have received my discharge instructions, and my questions have been answered. I have discussed any challenges I see with this plan with the nurse or doctor.    ..........................................................................................................................................  Patient/Patient Representative Signature      ..........................................................................................................................................  Patient Representative Print Name and Relationship to Patient    ..................................................               ................................................  Date                                   Time    ..........................................................................................................................................  Reviewed by Signature/Title    ...................................................              ..............................................  Date                                               Time          22EPIC Rev 08/18

## 2020-10-08 NOTE — ED PROVIDER NOTES
History     Chief Complaint:  Hemoptysis    HPI   Leo Gabriel is a 40 year old male who presents with 3 episodes of hemoptysis today.  The patient reports at least 2 years of ongoing intermittent episodes of hemoptysis which she has had evaluation of by gastroenterology with EGD as well as ENT with reported direct visualization of his pharynx and larynx.  He reports that when these episodes occur he gets congestion in his chest and throat and a stinging burning sensation in the back of his throat and then he begins to cough up small amounts of bright red blood.  He reports that occasionally if they occur while he is sleeping he will cough up a larger blood clot.  He states today when he coughs into a tissue the blood is about the size of a silver dollar.  He denies any significant chest pain or shortness of breath.  No dizziness lightheadedness, nausea, vomiting, fever.  No pleuritic pain.  He denies bleeding elsewhere.  No blood in the stool or urine.  No melena.  No easy bruising or bleeding.  No mucosal or gingival bleeding.  He reports he otherwise feels completely normal.  He denies any other symptoms.  He does not take aspirin or anticoagulation.  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  He notes that the episodes of hemoptysis typically occur mostly in the colder months.  And he did not have any over the summer.    Allergies:  Amoxicillin       Medications:    Singulair     Past Medical History:    The patient does not have any past pertinent medical history.     Past Surgical History:    Hernia repair     Family History:    Hypertension     Social History:  Smoking status- former smoker  Alcohol use- yes  Drug use- yes - marijuana   Marital Status:       Review of Systems   Constitutional: Negative for activity change and appetite change.   HENT: Negative for congestion, dental problem, ear pain, facial swelling, mouth sores,  nosebleeds, sinus pain, trouble swallowing and voice change.    Respiratory: Positive for cough. Negative for shortness of breath.    Cardiovascular: Negative for chest pain.   Gastrointestinal: Negative for abdominal pain, blood in stool, diarrhea and nausea.   Genitourinary: Negative for difficulty urinating and flank pain.   Skin: Negative for rash and wound.   Neurological: Negative for dizziness, syncope, light-headedness and headaches.   Psychiatric/Behavioral: Negative for confusion.   All other systems reviewed and are negative.      Physical Exam     Patient Vitals for the past 24 hrs:   BP Temp Temp src Pulse Resp SpO2 Weight   10/07/20 1909 (!) 145/107 98.9  F (37.2  C) Oral 59 20 100 % 61.2 kg (135 lb)     Physical Exam  General: Well appearing, nontoxic.  Resting comfortably  Head:  Scalp, face, and head appear normal  Eyes:  Pupils are equal, round, and reactive to light    Conjunctivae non-injected and sclerae white  ENT:    The external nose is normal    Pinnae are normal    The oropharynx is normal, mucous membranes moist    Posterior pharynx clear without swelling, exudates or erythema. No mucosal lesions, tongue or lip swelling. No tongue elevation. Uvula normal without deviation. Voice normal. No drooling or trismus.     Uvula is in the midline  Neck:  Normal range of motion. Soft tissues of the anterior neck are normal without swelling, lymphadenopathy, tenderness to palpation.     There is no rigidity noted    Trachea is in the midline  CV:  Regular rate and rhythm     Normal S1/S2, no S3/S4    No murmur or rub. Radial pulses 2+ bilaterally   Resp:  Lungs are clear and equal bilaterally    There is no tachypnea    No increased work of breathing    No rales, wheezing, or rhonchi  GI:  Abdomen is soft, no rigidity or guarding    No distension, or mass    No tenderness or rebound tenderness   MS:  Normal muscular tone    Symmetric motor strength    No lower extremity edema  Skin:  No rash or acute  skin lesions noted  Neuro: Awake and alert    Speech is normal and fluent    Moves all extremities spontaneously  Psych: Normal affect.  Appropriate interactions.      Emergency Department Course     Imaging:  Radiology findings were communicated with the patient who voiced understanding of the findings.    XR Chest Port 1 View:  Negative chest.  As read by Radiology.     Laboratory:  Laboratory findings were communicated with the patient who voiced understanding of the findings.    CBC: WNL (WBC 4.9, HGB 14.9, )  CMP: WNL (Creatinine: 1.05)    INR: 0.94     Emergency Department Course:  Past medical records, nursing notes, and vitals reviewed.    1927 I performed an exam of the patient as documented above.     1957 IV was inserted and blood was drawn for laboratory testing, results above.    1957 The patient was sent for a XR chest while in the emergency department, results above.     2036 I rechecked the patient and discussed the results of their workup thus far.     Findings and plan explained to the Patient. Patient discharged home with instructions regarding supportive care, medications, and reasons to return. The importance of close follow-up was reviewed.    Impression & Plan         Medical Decision Making:  Leo Gabriel is a 40 year old male presents for evaluation of a recurrent episode of hemoptysis.  The patient has had intermittent episodes of this over the last 2 years.  On my evaluation he is well-appearing, hemodynamically stable and afebrile.  There is no evidence of ongoing active bleeding at this time.  No syncope, shortness of breath, chest pain, pleuritic pain.  A broad differential diagnosis is considered including pneumonia, bronchitis, arteriovenous malformation, pharyngeal or laryngeal infection or irritation, pulmonary embolism, tuberculosis among many others.  Overall he is very well-appearing and largely asymptomatic with this.  It is possible that his long distance running in  the cold weather may be causing mucosal irritation and leading to mucosal bleeding given that he is running 26 miles at a time.  He reports he has had evaluation of his upper GI tract with EGD as well as evaluation by ENT who he reports did a scope of his upper airway.  He saw his primary care doctor for this problem today and they recommended close follow-up with pulmonology for consideration of bronchoscopy.  He has this appointment on Monday.  Work-up in the emergency department thankfully reassuring.  CBC is normal without thrombocytopenia.  INR is normal.  Patient is not anticoagulated.  Chemistry is normal.  Chest x-ray does not reveal any pathology.  No mass or lesions.  No mediastinal abnormality no evidence of infection clinically.  His signs and symptoms are not consistent with pulmonary embolism.  I stressed the importance of keeping this close follow-up appointment with pulmonology as well as to continue follow-up with his primary care physician.  He should return to the emergency department for any large-volume hemoptysis or any other worsening of his condition.  The patient was agreeable to plan of care.  Close return precautions were provided and he was discharged in stable condition.    Diagnosis:    ICD-10-CM   1. Hemoptysis  R04.2     Disposition:  Discharged to home.    Scribe Disclosure:  Karen VARELA, am serving as a scribe at 7:30 PM on 10/7/2020 to document services personally performed by Jacinto Mora MD based on my observations and the provider's statements to me.        Jacinto Mora MD  10/09/20 1034

## 2020-10-08 NOTE — DISCHARGE INSTRUCTIONS
-Try to avoid stronger harsh coughing as this can irritate the throat and increased bleeding.  -Use salt water gargles for 30 seconds at a time several times a day to help soothe the throat and decrease any swelling.  -Continue taking Benadryl as needed for allergy symptoms.

## 2020-10-08 NOTE — ED TRIAGE NOTES
Pt arrives with a couple years of intermittent bright red hemoptysis, he has seen ENT, pulmonology, and primary care for this, has tried multiple different allergy medications, was just prescribed some new meds yesterday, he denies pain, but reports that the coughing up blood has increased in the last 3 days. He questions whether anyone has evaluated his throat, but has had xrays and CT scans. ABCs intact, A/O x4.

## 2020-10-09 ASSESSMENT — ENCOUNTER SYMPTOMS
DIARRHEA: 0
DIFFICULTY URINATING: 0
VOICE CHANGE: 0
ACTIVITY CHANGE: 0
CONFUSION: 0
SHORTNESS OF BREATH: 0
APPETITE CHANGE: 0
COUGH: 1
TROUBLE SWALLOWING: 0
NAUSEA: 0
SINUS PAIN: 0
WOUND: 0
LIGHT-HEADEDNESS: 0
HEADACHES: 0
FACIAL SWELLING: 0
FLANK PAIN: 0
DIZZINESS: 0
ABDOMINAL PAIN: 0
BLOOD IN STOOL: 0

## 2020-10-12 ENCOUNTER — TRANSFERRED RECORDS (OUTPATIENT)
Dept: HEALTH INFORMATION MANAGEMENT | Facility: CLINIC | Age: 40
End: 2020-10-12

## 2020-11-10 ENCOUNTER — TELEPHONE (OUTPATIENT)
Dept: FAMILY MEDICINE | Facility: CLINIC | Age: 40
End: 2020-11-10

## 2020-11-10 NOTE — TELEPHONE ENCOUNTER
Order/Referral Request:    Who is requesting: [facility] @ [place] [phone]    Orders being requested: Needs referral.  Bronchoscopy with Fluoro    Reason service is needed/diagnosis:     When are orders needed by: today-procedure on 11/13 needs pre-op    Has this been discussed with Provider: ? Do not know. Pt has a video visit scheduled 11/12 @ 10:45.    Does patient have a preference on a Group/Provider/Facility? [facility]    Does patient have an appointment scheduled: Yes: Friday 11/13    Where to send Orders: Fax  [facility] @ [place] [phone]    Okay to leave detailed message?  Yes at Other phone number:  Olive asked that I call her back to update her if this will be done, they have also left msg to patient regarding this. Please advise and I will call her back with update. Ruth Behrens.

## 2020-11-10 NOTE — TELEPHONE ENCOUNTER
Left message [facility] medical records voicemail to fax recent visit note Pina Berry or Shelbi LAMB  for appointment 11/12/20.   On 10/6/20 Babar referred to [facility] [phone]   Jamal Lopez, RN

## 2020-11-11 NOTE — TELEPHONE ENCOUNTER
Received visit notes from [facility] October 12 visit. Transcribed to visit notes for upcoming visit on 11/12/20, attached below:     I had the pleasure of seeing your patient, Leo Gabriel, for follow up today. The following is a summary of today's visit and my recommendations.   This 40 year old male presents for Hemoptysis.   History of present illness  1. Hemoptysis  Onset of symptoms was 5 yo 9 months ago. Recently the hemoptysis has changed. The most recent episode lasted varies. The amount of hemoptysis is mild. Pertinent negatives include calf tenderness, chills, dyspnea on exertion, easy bruising, epistaxis, fever, nausea, night sweats, unilateral leg edema, vomiting, and wheezing.  Patient is here for a pulmonary followup  Patient reports that the last time he coughed up blood was 10/8/2020  Patient reports that until recently he last coughed up blood in April  Patient reports that the last time he vaped or smoked anything was in January  Patient was screened for COVID19, this RN ran Er visit and new coughing up of blood by the nurse manager.     I have ordered the following diagnostic tests and/or labs:    CT chest WITH contrast  Bronchoscopy with Fluoroscopy  Assessment and Plan:    # Detail Type  Description  1.  Assessment   Hemoptysis (R04.2)   Plan Orders  Further diagnostic evaluations ordered today include(s) Bronchoscopy With Fluoroscopy to be performed and CT Chest WITH Contrast to be performed.     Leo comes in for reevaluation for blood tinged sputum. He apparently had an evaluation by ear nose and throat and gastroenterology which I do not see the records for but they do not find any source for his blood-tinged sputum. I suggest we repeat a CT chest and proceed with bronchoscopy and airway inspection. His previous CT did show minimal infiltrate at the bases which could be consistent with blood and based on that we will proceed with further evaluation.     Sincerely,   Phil Peters  MD Shelbi Castillo, BSN, RN, PHN

## 2020-11-11 NOTE — PROGRESS NOTES
Notes from [facility] arrive via fax. Below is the record:     I had the pleasure of seeing your patient, Leo Gabriel, for follow up today. The following is a summary of today's visit and my recommendations.   This 40 year old male presents for Hemoptysis.   History of present illness  1. Hemoptysis  Onset of symptoms was 3 yo 9 months ago. Recently the hemoptysis has changed. The most recent episode lasted varies. The amount of hemoptysis is mild. Pertinent negatives include calf tenderness, chills, dyspnea on exertion, easy bruising, epistaxis, fever, nausea, night sweats, unilateral leg edema, vomiting, and wheezing.  Patient is here for a pulmonary followup  Patient reports that the last time he coughed up blood was 10/8/2020  Patient reports that until recently he last coughed up blood in April  Patient reports that the last time he vaped or smoked anything was in January  Patient was screened for COVID19, this RN ran Er visit and new coughing up of blood by the nurse manager.     I have ordered the following diagnostic tests and/or labs:    CT chest WITH contrast  Bronchoscopy with Fluoroscopy  Assessment and Plan:    # Detail Type  Description  1.  Assessment   Hemoptysis (R04.2)   Plan Orders  Further diagnostic evaluations ordered today include(s) Bronchoscopy With Fluoroscopy to be performed and CT Chest WITH Contrast to be performed.     Leo comes in for reevaluation for blood tinged sputum. He apparently had an evaluation by ear nose and throat and gastroenterology which I do not see the records for but they do not find any source for his blood-tinged sputum. I suggest we repeat a CT chest and proceed with bronchoscopy and airway inspection. His previous CT did show minimal infiltrate at the bases which could be consistent with blood and based on that we will proceed with further evaluation.     Sincerely,   Phil Peters MD

## 2020-11-12 ENCOUNTER — VIRTUAL VISIT (OUTPATIENT)
Dept: FAMILY MEDICINE | Facility: CLINIC | Age: 40
End: 2020-11-12
Payer: COMMERCIAL

## 2020-11-12 DIAGNOSIS — R04.2 HEMOPTYSIS: Primary | ICD-10-CM

## 2020-11-12 PROCEDURE — 99213 OFFICE O/P EST LOW 20 MIN: CPT | Mod: GT | Performed by: PHYSICIAN ASSISTANT

## 2020-11-12 NOTE — Clinical Note
[facility] New Patient    Patient: Fatou Simental MRN# [id number]   YOB: 1967 Age: 51 year old   Date of Visit: Aug 14, 2018    CC: Droopy eyelids obstructing vision.  Chief Complaints and History of Present Illnesses   Patient presents with     Droopy Left Upper Lid                 HPI:     Fatou Simental is a 51 year old female who has noted gradual onset of droopy eyelids over the past years. The left seems worse than the right. The droopy eyelid is interfering with activities of daily living including driving, and reading. The patient denies double vision, variability of the eyelid position. She has moderate dry eye symptoms but does not use artificial tears.     EXAM:     Significant frontalis recruitment  MRD1: 2 right eye and 1 left eye   Brow ptosis on the left causing asymmetric hooding.    VISUAL FIELD:  Right eye untaped:30 degrees Right eye taped:50 degrees  Left eye untaped:25 degrees Left eye taped:50 degrees    Assessment & Plan     Fatou Simental is a 51 year old female with the following diagnoses:   1. Involutional ptosis, acquired, bilateral         Bilateral ptosis repair external levator approach (Levator + skin more left to account for brow ptosis).       ANTICOAGULATION:  Rare ibupuprofen, can hold.          PHOTOS DEMONSTRATE:    Aponeurotic blepharoptosis        Attending Physician Attestation:  Complete documentation of historical and exam elements from today's encounter can be found in the full encounter summary report (not reduplicated in this progress note).  I personally obtained the chief complaint(s) and history of present illness.  I confirmed and edited as necessary the review of systems, past medical/surgical history, family history, social history, and examination findings as documented by others; and I examined the patient myself.  I personally reviewed the relevant tests, images, and reports as documented above.  I formulated and edited as  Patient states needed a general surgery referral to have his bronchoscopy done and the surgical center did not except this frorm his pulmonologist. I ordered referral. Please fax to his surgery center.     -rosanna schneider, pac necessary the assessment and plan and discussed the findings and management plan with the patient and family. - Wilton Viramontes MD      Today with Fatou Simental, I reviewed the indications, risks, benefits, and alternatives of the proposed surgical procedure including, but not limited to, failure obtain the desired result  and need for additional surgery, bleeding, infection, loss of vision, loss of the eye, and the remote possibility of permanent damage to any organ system or death with the use of anesthesia.  I provided multiple opportunities for the questions, answered all questions to the best of my ability, and confirmed that my answers and my discussion were understood.

## 2020-11-12 NOTE — PROGRESS NOTES
"Leo Gabriel is a 40 year old male who is being evaluated via a billable video visit.      The patient has been notified of following:     \"This video visit will be conducted via a call between you and your physician/provider. We have found that certain health care needs can be provided without the need for an in-person physical exam.  This service lets us provide the care you need with a video conversation.  If a prescription is necessary we can send it directly to your pharmacy.  If lab work is needed we can place an order for that and you can then stop by our lab to have the test done at a later time.    Video visits are billed at different rates depending on your insurance coverage.  Please reach out to your insurance provider with any questions.    If during the course of the call the physician/provider feels a video visit is not appropriate, you will not be charged for this service.\"    Patient has given verbal consent for Video visit? Yes  How would you like to obtain your AVS? MyChart  If you are dropped from the video visit, the video invite should be resent to: Text to cell phone: [phone]  Will anyone else be joining your video visit? No    Subjective     Leo Gabriel is a 40 year old male who presents today via video visit for the following health issues:    HPI     ED/UC Followup:    Facility:  [facility]  Date of visit: 10/7/2020  Reason for visit: Hemoptysis-came back after pneumonia   Current Status: not sure what this is coming from-needs referral -can feel the itch in the throat and burning sensation then will cough blood-needs referral for his bronchoscopy due to being out of network and not being accepted from his pulmonologist.     Followed by pulm. Bronchoscopy ordered. However, patient's daughter just returned positive for covid-19 and patient is a close contact.            Video Start Time: 11:27 AM        Review of Systems       Objective           Vitals:  No vitals were " obtained today due to virtual visit.    Physical Exam     GENERAL: Healthy, alert and no distress  EYES: Eyes grossly normal to inspection.  No discharge or erythema, or obvious scleral/conjunctival abnormalities.  RESP: No audible wheeze, cough, or visible cyanosis.  No visible retractions or increased work of breathing.    SKIN: Visible skin clear. No significant rash, abnormal pigmentation or lesions.  NEURO: Cranial nerves grossly intact.  Mentation and speech appropriate for age.  PSYCH: Mentation appears normal, affect normal/bright, judgement and insight intact, normal speech and appearance well-groomed.          Assessment & Plan     Hemoptysis  Ongoing. Needing referral to obtain bronchoscopy that is scheduled. Referral placed. Will have triage inform surgery center, but recommending calling surgery center. Likely to postpone procedure due to covid exposure.   - GENERAL SURG ADULT REFERRAL; Future          No follow-ups on file.    Maxime Rojo PA-C  [facility].      Video-Visit Details    Type of service:  Video Visit    Video End Time:11:45 AM    Originating Location (pt. Location): Home    Distant Location (provider location):  [facility] [place]     Platform used for Video Visit: Debi

## 2020-11-13 NOTE — PROGRESS NOTES
[facility]   Oswaldo Skelton MD  Pulmonary Medicine  [facility]  Source Organization  BRONCHOSCOPY WITH FLUORO  Comments       [address] [place] [address].   [place] [address]       Phone: [phone]   Fax: [phone]    Referral faxed.    Silvia Lawton RN

## 2020-11-24 ENCOUNTER — TELEPHONE (OUTPATIENT)
Dept: FAMILY MEDICINE | Facility: CLINIC | Age: 40
End: 2020-11-24

## 2020-11-24 NOTE — TELEPHONE ENCOUNTER
Spoke to staff member at surgery center with referral number.     [facility]   Oswaldo Skelton MD  Pulmonary Medicine  [facility]  Source Organization  BRONCHOSCOPY WITH FLUORO  Comments        [address]   [place] [address]       Phone: [phone]   Fax: [phone]     Referral # [id number]    Sharon Krause RN Flex

## 2020-11-24 NOTE — TELEPHONE ENCOUNTER
Referral has been submitted to pt's insurance.    Pt has been informed.    Cristiana-Referral Coordinator

## 2021-01-15 ENCOUNTER — HEALTH MAINTENANCE LETTER (OUTPATIENT)
Age: 41
End: 2021-01-15

## 2021-09-05 ENCOUNTER — HEALTH MAINTENANCE LETTER (OUTPATIENT)
Age: 41
End: 2021-09-05

## 2022-02-20 ENCOUNTER — HEALTH MAINTENANCE LETTER (OUTPATIENT)
Age: 42
End: 2022-02-20

## 2022-02-25 ENCOUNTER — OFFICE VISIT (OUTPATIENT)
Dept: PEDIATRICS | Facility: CLINIC | Age: 42
End: 2022-02-25
Payer: COMMERCIAL

## 2022-02-25 VITALS
DIASTOLIC BLOOD PRESSURE: 78 MMHG | TEMPERATURE: 97.8 F | OXYGEN SATURATION: 99 % | HEART RATE: 57 BPM | SYSTOLIC BLOOD PRESSURE: 118 MMHG | WEIGHT: 143.8 LBS | BODY MASS INDEX: 23.21 KG/M2

## 2022-02-25 DIAGNOSIS — M25.512 ACUTE PAIN OF LEFT SHOULDER: Primary | ICD-10-CM

## 2022-02-25 PROCEDURE — 99213 OFFICE O/P EST LOW 20 MIN: CPT | Performed by: INTERNAL MEDICINE

## 2022-02-25 NOTE — PROGRESS NOTES
Assessment & Plan       ICD-10-CM    1. Acute pain of left shoulder  M25.512 Orthopedic  Referral     Unclear cause for shoulder pain. Ongoing a long duration, potentially lingering ligament strain. Unlikely rotator cuff tear. Fracture less likely.  Reviewed options.  Will defer imaging today and refer to orthopedics.     Diego Queen MD  [facility] SHINE Bailey is a 41 year old who presents for the following health issues     History of Present Illness     Reason for visit:  Left shoulder  Symptom onset:  More than a month  Symptoms include:  Left shoulder pain  Symptom intensity:  Moderate  Symptom progression:  Staying the same  Had these symptoms before:  No  What makes it worse:  Sleeping on it  What makes it better:  Nope    He eats 0-1 servings of fruits and vegetables daily.He consumes 0 sweetened beverage(s) daily.He exercises with enough effort to increase his heart rate 30 to 60 minutes per day.  He exercises with enough effort to increase his heart rate 5 days per week.   He is taking medications regularly.       Pain History:  When did you first notice your pain? - More than 6 weeks, patient was running and landed on the left side.   Have you seen this provider for your pain in the past? No   Where in your body do your have pain? Left shoulder  Are you seeing anyone else for your pain? No  What makes your pain better? Nothing   What makes your pain worse? Laying on the left side   How has pain affected your ability to work? Pain does not limit ability to work      Pt was running outdoors. Fell to his left side, landed on his left shoulder.   Pain has been persistent since that time.  Is stretching the shoulder, has tried multiple different stretches. Pain is noted worst with lateral abduction. Has pain near 180 degrees with anterior abduction.  Will intermittently note tingling in the left arm when he has pain in the shoulder.    Objective    /78 (BP  Location: Right arm, Patient Position: Sitting, Cuff Size: Adult Regular)   Pulse 57   Temp 97.8  F (36.6  C) (Temporal)   Wt 65.2 kg (143 lb 12.8 oz)   SpO2 99%   BMI 23.21 kg/m    Body mass index is 23.21 kg/m .  Physical Exam   GEN: no distress  SKIN: no rashes  MS: No shoulder effusion noted. Positive impingement left shoulder. No pain w/ resisted supraspinatus movement. Discomfort w/ resisted external rotation. Slightly limited ability to reach behind his back.  NEURO: Strength and sensation grossly intact BUE.

## 2022-10-23 ENCOUNTER — HEALTH MAINTENANCE LETTER (OUTPATIENT)
Age: 42
End: 2022-10-23

## 2022-11-09 ENCOUNTER — OFFICE VISIT (OUTPATIENT)
Dept: FAMILY MEDICINE | Facility: CLINIC | Age: 42
End: 2022-11-09
Payer: COMMERCIAL

## 2022-11-09 VITALS
BODY MASS INDEX: 22.76 KG/M2 | RESPIRATION RATE: 12 BRPM | OXYGEN SATURATION: 98 % | SYSTOLIC BLOOD PRESSURE: 120 MMHG | WEIGHT: 141.6 LBS | DIASTOLIC BLOOD PRESSURE: 76 MMHG | HEIGHT: 66 IN | TEMPERATURE: 99.1 F | HEART RATE: 64 BPM

## 2022-11-09 DIAGNOSIS — Z13.220 SCREENING FOR HYPERLIPIDEMIA: ICD-10-CM

## 2022-11-09 DIAGNOSIS — R19.7 DIARRHEA, UNSPECIFIED TYPE: ICD-10-CM

## 2022-11-09 DIAGNOSIS — R05.2 SUBACUTE COUGH: ICD-10-CM

## 2022-11-09 DIAGNOSIS — R35.89 POLYURIA: ICD-10-CM

## 2022-11-09 DIAGNOSIS — Z00.00 ROUTINE GENERAL MEDICAL EXAMINATION AT A HEALTH CARE FACILITY: Primary | ICD-10-CM

## 2022-11-09 DIAGNOSIS — Z12.5 SCREENING FOR PROSTATE CANCER: ICD-10-CM

## 2022-11-09 DIAGNOSIS — Z11.59 NEED FOR HEPATITIS C SCREENING TEST: ICD-10-CM

## 2022-11-09 LAB
ALBUMIN SERPL-MCNC: 3.9 G/DL (ref 3.4–5)
ALP SERPL-CCNC: 64 U/L (ref 40–150)
ALT SERPL W P-5'-P-CCNC: 35 U/L (ref 0–70)
ANION GAP SERPL CALCULATED.3IONS-SCNC: 7 MMOL/L (ref 3–14)
AST SERPL W P-5'-P-CCNC: 39 U/L (ref 0–45)
BILIRUB SERPL-MCNC: 0.3 MG/DL (ref 0.2–1.3)
BUN SERPL-MCNC: 11 MG/DL (ref 7–30)
CALCIUM SERPL-MCNC: 9.6 MG/DL (ref 8.5–10.1)
CHLORIDE BLD-SCNC: 108 MMOL/L (ref 94–109)
CHOLEST SERPL-MCNC: 230 MG/DL
CO2 SERPL-SCNC: 25 MMOL/L (ref 20–32)
CREAT SERPL-MCNC: 0.82 MG/DL (ref 0.66–1.25)
FASTING STATUS PATIENT QL REPORTED: ABNORMAL
GFR SERPL CREATININE-BSD FRML MDRD: >90 ML/MIN/1.73M2
GLUCOSE BLD-MCNC: 99 MG/DL (ref 70–99)
HCV AB SERPL QL IA: NONREACTIVE
HDLC SERPL-MCNC: 52 MG/DL
LDLC SERPL CALC-MCNC: 154 MG/DL
NONHDLC SERPL-MCNC: 178 MG/DL
POTASSIUM BLD-SCNC: 4 MMOL/L (ref 3.4–5.3)
PROT SERPL-MCNC: 7.6 G/DL (ref 6.8–8.8)
PSA SERPL-MCNC: 0.75 UG/L (ref 0–4)
SODIUM SERPL-SCNC: 140 MMOL/L (ref 133–144)
TRIGL SERPL-MCNC: 120 MG/DL

## 2022-11-09 PROCEDURE — 90471 IMMUNIZATION ADMIN: CPT | Performed by: PHYSICIAN ASSISTANT

## 2022-11-09 PROCEDURE — 86803 HEPATITIS C AB TEST: CPT | Performed by: PHYSICIAN ASSISTANT

## 2022-11-09 PROCEDURE — 80061 LIPID PANEL: CPT | Performed by: PHYSICIAN ASSISTANT

## 2022-11-09 PROCEDURE — 99396 PREV VISIT EST AGE 40-64: CPT | Mod: 25 | Performed by: PHYSICIAN ASSISTANT

## 2022-11-09 PROCEDURE — 99214 OFFICE O/P EST MOD 30 MIN: CPT | Mod: 25 | Performed by: PHYSICIAN ASSISTANT

## 2022-11-09 PROCEDURE — 36415 COLL VENOUS BLD VENIPUNCTURE: CPT | Performed by: PHYSICIAN ASSISTANT

## 2022-11-09 PROCEDURE — G0103 PSA SCREENING: HCPCS | Performed by: PHYSICIAN ASSISTANT

## 2022-11-09 PROCEDURE — 80053 COMPREHEN METABOLIC PANEL: CPT | Performed by: PHYSICIAN ASSISTANT

## 2022-11-09 PROCEDURE — 90686 IIV4 VACC NO PRSV 0.5 ML IM: CPT | Performed by: PHYSICIAN ASSISTANT

## 2022-11-09 RX ORDER — IPRATROPIUM BROMIDE 21 UG/1
1-2 SPRAY, METERED NASAL 3 TIMES DAILY PRN
Qty: 30 ML | Refills: 3 | Status: SHIPPED | OUTPATIENT
Start: 2022-11-09 | End: 2023-11-21

## 2022-11-09 SDOH — ECONOMIC STABILITY: TRANSPORTATION INSECURITY
IN THE PAST 12 MONTHS, HAS THE LACK OF TRANSPORTATION KEPT YOU FROM MEDICAL APPOINTMENTS OR FROM GETTING MEDICATIONS?: NO

## 2022-11-09 SDOH — HEALTH STABILITY: PHYSICAL HEALTH: ON AVERAGE, HOW MANY DAYS PER WEEK DO YOU ENGAGE IN MODERATE TO STRENUOUS EXERCISE (LIKE A BRISK WALK)?: 6 DAYS

## 2022-11-09 SDOH — HEALTH STABILITY: PHYSICAL HEALTH: ON AVERAGE, HOW MANY MINUTES DO YOU ENGAGE IN EXERCISE AT THIS LEVEL?: 60 MIN

## 2022-11-09 SDOH — ECONOMIC STABILITY: TRANSPORTATION INSECURITY
IN THE PAST 12 MONTHS, HAS LACK OF TRANSPORTATION KEPT YOU FROM MEETINGS, WORK, OR FROM GETTING THINGS NEEDED FOR DAILY LIVING?: NO

## 2022-11-09 SDOH — ECONOMIC STABILITY: FOOD INSECURITY: WITHIN THE PAST 12 MONTHS, THE FOOD YOU BOUGHT JUST DIDN'T LAST AND YOU DIDN'T HAVE MONEY TO GET MORE.: NEVER TRUE

## 2022-11-09 SDOH — ECONOMIC STABILITY: INCOME INSECURITY: HOW HARD IS IT FOR YOU TO PAY FOR THE VERY BASICS LIKE FOOD, HOUSING, MEDICAL CARE, AND HEATING?: NOT HARD AT ALL

## 2022-11-09 SDOH — ECONOMIC STABILITY: INCOME INSECURITY: IN THE LAST 12 MONTHS, WAS THERE A TIME WHEN YOU WERE NOT ABLE TO PAY THE MORTGAGE OR RENT ON TIME?: NO

## 2022-11-09 SDOH — ECONOMIC STABILITY: FOOD INSECURITY: WITHIN THE PAST 12 MONTHS, YOU WORRIED THAT YOUR FOOD WOULD RUN OUT BEFORE YOU GOT MONEY TO BUY MORE.: NEVER TRUE

## 2022-11-09 ASSESSMENT — ENCOUNTER SYMPTOMS
JOINT SWELLING: 0
HEADACHES: 1
HEMATURIA: 0
CONSTIPATION: 0
SHORTNESS OF BREATH: 0
NERVOUS/ANXIOUS: 1
SORE THROAT: 1
DIARRHEA: 1
ABDOMINAL PAIN: 1
FREQUENCY: 1
ARTHRALGIAS: 0
HEARTBURN: 0
MYALGIAS: 0
PARESTHESIAS: 0
PALPITATIONS: 0
NAUSEA: 0
COUGH: 1
HEMATOCHEZIA: 0
WEAKNESS: 0
CHILLS: 0
DYSURIA: 0

## 2022-11-09 ASSESSMENT — SOCIAL DETERMINANTS OF HEALTH (SDOH)
HOW OFTEN DO YOU ATTEND CHURCH OR RELIGIOUS SERVICES?: NEVER
IN A TYPICAL WEEK, HOW MANY TIMES DO YOU TALK ON THE PHONE WITH FAMILY, FRIENDS, OR NEIGHBORS?: NEVER
HOW OFTEN DO YOU GET TOGETHER WITH FRIENDS OR RELATIVES?: NEVER
DO YOU BELONG TO ANY CLUBS OR ORGANIZATIONS SUCH AS CHURCH GROUPS UNIONS, FRATERNAL OR ATHLETIC GROUPS, OR SCHOOL GROUPS?: YES

## 2022-11-09 ASSESSMENT — LIFESTYLE VARIABLES
AUDIT-C TOTAL SCORE: 0
HOW OFTEN DO YOU HAVE A DRINK CONTAINING ALCOHOL: NEVER
HOW OFTEN DO YOU HAVE SIX OR MORE DRINKS ON ONE OCCASION: NEVER
SKIP TO QUESTIONS 9-10: 1
HOW MANY STANDARD DRINKS CONTAINING ALCOHOL DO YOU HAVE ON A TYPICAL DAY: PATIENT DOES NOT DRINK

## 2022-11-09 NOTE — PROGRESS NOTES
SUBJECTIVE:   CC: livia is an 42 year old who presents for preventative health visit.       Patient has been advised of split billing requirements and indicates understanding: Yes  Healthy Habits:     Getting at least 3 servings of Calcium per day:  NO    Bi-annual eye exam:  NO    Dental care twice a year:  NO    Sleep apnea or symptoms of sleep apnea:  Daytime drowsiness    Diet:  Regular (no restrictions)    Frequency of exercise:  6-7 days/week    Duration of exercise:  Greater than 60 minutes    Taking medications regularly:  Yes    Medication side effects:  None    PHQ-2 Total Score: 2    Additional concerns today:  No      On going intermittent coughing and itching throat with congestion. Has attempted many     Urination. Notes more urination throughout the day and at night, but has noted increased water intake to aid in above symptoms. Maternal grandfather with prostate cancer in his 50s.     Also noted over the last year noted increased loose to watery stools. 2-3 times daily. Worse with greasy/fatty meals. 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 No blood in stools. No weight loss. No night sweats. Crampy abdominal pain and does get better with defecation. Patient history of gerd, but no recent symptoms.       Today's PHQ-2 Score:   PHQ-2 ( 1999 Pfizer) 11/9/2022   Q1: Little interest or pleasure in doing things 1   Q2: Feeling down, depressed or hopeless 1   PHQ-2 Score 2   PHQ-2 Total Score (12-17 Years)- Positive if 3 or more points; Administer PHQ-A if positive -   Q1: Little interest or pleasure in doing things Several days   Q2: Feeling down, depressed or hopeless Several days   PHQ-2 Score 2       Abuse: Current or Past(Physical, Sexual or Emotional)- No  Do you feel safe in your environment? Yes        Social History     Tobacco Use     Smoking status: Former     Packs/day: 1.00     Years: 10.00     Pack years: 10.00     Types:  Cigarettes     Quit date: 5/1/2007     Years since quitting: 15.5     Smokeless tobacco: Never   Substance Use Topics     Alcohol use: Not Currently         Alcohol Use 11/9/2022   Prescreen: >3 drinks/day or >7 drinks/week? No       Last PSA: No results found for: PSA    Reviewed orders with patient. Reviewed health maintenance and updated orders accordingly - Yes  BP Readings from Last 3 Encounters:   11/09/22 120/76   02/25/22 118/78   10/07/20 (!) 145/107    Wt Readings from Last 3 Encounters:   11/09/22 64.2 kg (141 lb 9.6 oz)   02/25/22 65.2 kg (143 lb 12.8 oz)   10/07/20 61.2 kg (135 lb)                  Patient Active Problem List   Diagnosis     Subacute cough     Past Surgical History:   Procedure Laterality Date     HERNIA REPAIR         Social History     Tobacco Use     Smoking status: Former     Packs/day: 1.00     Years: 10.00     Pack years: 10.00     Types: Cigarettes     Quit date: 5/1/2007     Years since quitting: 15.5     Smokeless tobacco: Never   Substance Use Topics     Alcohol use: Not Currently     Family History   Problem Relation Age of Onset     Hypertension Father      Prostate Cancer Maternal Grandfather 50     Cancer Paternal Grandmother         Lymphoma     Diabetes Paternal Grandmother          Current Outpatient Medications   Medication Sig Dispense Refill     ipratropium (ATROVENT) 0.03 % nasal spray Spray 1-2 sprays into both nostrils 3 times daily as needed for rhinitis 30 mL 3     Allergies   Allergen Reactions     Amoxicillin Difficulty breathing and Rash     Recent Labs   Lab Test 10/07/20  1954 03/02/20  2225   ALT 21 25   CR 1.05 0.98   GFRESTIMATED 87 >90   GFRESTBLACK >90 >90   POTASSIUM 3.5 3.6        Reviewed and updated as needed this visit by clinical staff   Tobacco  Allergies  Meds  Problems  Med Hx  Surg Hx  Fam Hx  Soc   Hx        Reviewed and updated as needed this visit by Provider   Tobacco  Allergies  Meds  Problems  Med Hx  Surg Hx  Fam Hx        "  History reviewed. No pertinent past medical history.   Past Surgical History:   Procedure Laterality Date     HERNIA REPAIR         Review of Systems   Constitutional: Negative for chills.   HENT: Positive for ear pain, hearing loss and sore throat. Negative for congestion.    Eyes: Negative for visual disturbance.   Respiratory: Positive for cough. Negative for shortness of breath.    Cardiovascular: Negative for chest pain, palpitations and peripheral edema.   Gastrointestinal: Positive for abdominal pain and diarrhea. Negative for constipation, heartburn, hematochezia and nausea.   Genitourinary: Positive for frequency. Negative for dysuria, hematuria, impotence, penile discharge and urgency.   Musculoskeletal: Negative for arthralgias, joint swelling and myalgias.   Skin: Negative for rash.   Neurological: Positive for headaches. Negative for weakness and paresthesias.   Psychiatric/Behavioral: Negative for mood changes. The patient is nervous/anxious.          OBJECTIVE:   /76 (BP Location: Right arm, Patient Position: Sitting, Cuff Size: Adult Regular)   Pulse 64   Temp 99.1  F (37.3  C) (Oral)   Resp 12   Ht 1.676 m (5' 6\")   Wt 64.2 kg (141 lb 9.6 oz)   SpO2 98%   BMI 22.85 kg/m      Physical Exam  GENERAL: healthy, alert and no distress  EYES: Eyes grossly normal to inspection, PERRL and conjunctivae and sclerae normal  HENT: ear canals and TM's normal, nose and mouth without ulcers or lesions  NECK: no adenopathy, no asymmetry, masses, or scars and thyroid normal to palpation  RESP: lungs clear to auscultation - no rales, rhonchi or wheezes  CV: regular rate and rhythm, normal S1 S2, no S3 or S4, no murmur, click or rub, no peripheral edema and peripheral pulses strong  ABDOMEN: soft, nontender, no hepatosplenomegaly, no masses and bowel sounds normal  MS: no gross musculoskeletal defects noted, no edema  SKIN: no suspicious lesions or rashes  NEURO: Normal strength and tone, mentation " intact and speech normal  PSYCH: mentation appears normal, affect normal/bright  LYMPH: no cervical adenopathy    Diagnostic Test Results:  none     ASSESSMENT/PLAN:   (Z00.00) Routine general medical examination at a health care facility  (primary encounter diagnosis)  Comment: stable wellness. Discussed below concerns.   Plan:     (R05.2) Subacute cough  Comment: ongoing for years. Extensive work up including bronchoscopy. Patient states this was normal. Given description, allergy etiology felt likely. Will have see allergist for second opinion.   Plan: ipratropium (ATROVENT) 0.03 % nasal spray,         Adult Allergy/Asthma Referral        Medication use and side effects discussed with the patient. Patient is in complete understanding and agreement with plan.       (R19.7) Diarrhea, unspecified type  Comment: present for years. Since stopping alcohol, no improvement. Given course length, stool studies recommended along with cmp. If work up negative, consider colonoscopy.   Plan: Comprehensive metabolic panel (BMP + Alb, Alk         Phos, ALT, AST, Total. Bili, TP), Ova and         Parasite Exam Routine, Enteric Bacteria and         Virus Panel by [facility] Stool, Fecal Lactoferrin            (R35.89) Polyuria  Comment: given increased water intake due to cough, likely cause. Glucose pending in cmp. Patient concerned about prostaet cancer given family history. Reassured given age and typically it's quite common to NOT have urination changes with prostate cancer. However, given family history, will obtain PSA today.   Plan:     (Z12.5) Screening for prostate cancer  Comment: as above   Plan: PSA, screen            (Z11.59) Need for hepatitis C screening test  Comment:   Plan: Hepatitis C Screen Reflex to HCV RNA Quant and         Genotype            (Z13.220) Screening for hyperlipidemia  Comment:   Plan: Lipid panel reflex to direct LDL Non-fasting              Patient has been advised of split billing requirements and  "indicates understanding: Yes      COUNSELING:   Reviewed preventive health counseling, as reflected in patient instructions       Regular exercise       Healthy diet/nutrition       Immunizations    Vaccinated for: Influenza             Consider Hep C screening for all patients one time for ages 18-79 years       Prostate cancer screening    Estimated body mass index is 22.85 kg/m  as calculated from the following:    Height as of this encounter: 1.676 m (5' 6\").    Weight as of this encounter: 64.2 kg (141 lb 9.6 oz).         He reports that he quit smoking about 15 years ago. His smoking use included cigarettes. He has a 10.00 pack-year smoking history. He has never used smokeless tobacco.      Counseling Resources:  ATP IV Guidelines  Pooled Cohorts Equation Calculator  FRAX Risk Assessment  ICSI Preventive Guidelines  Dietary Guidelines for Americans, 2010  USDA's MyPlate  ASA Prophylaxis  Lung CA Screening    Maxime Rojo PA-C  [facility]  "

## 2022-11-10 LAB — LACTOFERRIN STL QL IA: NEGATIVE

## 2022-11-10 PROCEDURE — 83630 LACTOFERRIN FECAL (QUAL): CPT | Performed by: PHYSICIAN ASSISTANT

## 2022-11-10 PROCEDURE — 87177 OVA AND PARASITES SMEARS: CPT | Performed by: PHYSICIAN ASSISTANT

## 2022-11-10 PROCEDURE — 87209 SMEAR COMPLEX STAIN: CPT | Performed by: PHYSICIAN ASSISTANT

## 2022-11-10 PROCEDURE — 87506 IADNA-DNA/RNA PROBE TQ 6-11: CPT | Performed by: PHYSICIAN ASSISTANT

## 2022-11-11 LAB
C COLI+JEJUNI+LARI FUSA STL QL NAA+PROBE: NOT DETECTED
EC STX1 GENE STL QL NAA+PROBE: NOT DETECTED
EC STX2 GENE STL QL NAA+PROBE: NOT DETECTED
NOROV GI+II ORF1-ORF2 JNC STL QL NAA+PR: NOT DETECTED
O+P STL MICRO: NEGATIVE
RVA NSP5 STL QL NAA+PROBE: NOT DETECTED
SALMONELLA SP RPOD STL QL NAA+PROBE: NOT DETECTED
SHIGELLA SP+EIEC IPAH STL QL NAA+PROBE: NOT DETECTED
V CHOL+PARA RFBL+TRKH+TNAA STL QL NAA+PR: NOT DETECTED
Y ENTERO RECN STL QL NAA+PROBE: NOT DETECTED

## 2022-11-21 ENCOUNTER — TRANSFERRED RECORDS (OUTPATIENT)
Dept: HEALTH INFORMATION MANAGEMENT | Facility: CLINIC | Age: 42
End: 2022-11-21

## 2022-11-22 ENCOUNTER — TRANSFERRED RECORDS (OUTPATIENT)
Dept: HEALTH INFORMATION MANAGEMENT | Facility: CLINIC | Age: 42
End: 2022-11-22

## 2022-11-28 ENCOUNTER — TRANSFERRED RECORDS (OUTPATIENT)
Dept: HEALTH INFORMATION MANAGEMENT | Facility: CLINIC | Age: 42
End: 2022-11-28

## 2023-05-16 ENCOUNTER — MYC MEDICAL ADVICE (OUTPATIENT)
Dept: FAMILY MEDICINE | Facility: CLINIC | Age: 43
End: 2023-05-16
Payer: COMMERCIAL

## 2023-05-16 NOTE — TELEPHONE ENCOUNTER
See "[employer] message, ok for E visit? Please review and advise       Last office visit: 11/9/2022   Kayla Cruz RN, BSN  [facility]

## 2023-05-18 ENCOUNTER — DOCUMENTATION ONLY (OUTPATIENT)
Dept: LAB | Facility: CLINIC | Age: 43
End: 2023-05-18
Payer: COMMERCIAL

## 2023-05-18 NOTE — PROGRESS NOTES
"Pt has a lab only coming up and currently no labs.  Please review and place future orders.   Questions or concerns, please have your care team contact pt directly.    Pt made apt via [employer] and left this comment,  \"I need a blood test for Celiac, my daughter is confirmed positive via blood test and endoscopy.  They are requiring all family members get tested.  I have had digestive issues since forever, so the fam thinks I am the genetic giver.\"    Thank You  Rimma BALL  "

## 2023-05-19 ENCOUNTER — E-VISIT (OUTPATIENT)
Dept: FAMILY MEDICINE | Facility: CLINIC | Age: 43
End: 2023-05-19

## 2023-05-19 DIAGNOSIS — Z83.79 FAMILY HISTORY OF CELIAC DISEASE: Primary | ICD-10-CM

## 2023-05-19 PROCEDURE — 99421 OL DIG E/M SVC 5-10 MIN: CPT

## 2023-05-19 NOTE — PATIENT INSTRUCTIONS
Thank you for choosing us for your care. Given your symptoms, I would like you to do a lab-only visit to determine what is causing them.  I have placed the orders.  [place] or call [phone].  I will let you know when the results are back and next steps to take.

## 2023-05-19 NOTE — PROGRESS NOTES
I called pt to let know needs a an evisit before can have labs. Chloe will be reaching out to help patient.  I have cancelled the lab appt.  Letitia Bernardo, TC

## 2023-05-19 NOTE — PROGRESS NOTES
This RN spoke to patient and informed a visit was needed. RN walked patient through how to submit an e-visit via [employer].     Patient submitted e-visit. Will wait for provider review and response.     Chloe GREEN RN, BSN, PHN  [facility]  [phone]

## 2023-05-22 ENCOUNTER — LAB (OUTPATIENT)
Dept: LAB | Facility: CLINIC | Age: 43
End: 2023-05-22
Payer: COMMERCIAL

## 2023-05-22 DIAGNOSIS — Z83.79 FAMILY HISTORY OF CELIAC DISEASE: ICD-10-CM

## 2023-05-22 PROCEDURE — 36415 COLL VENOUS BLD VENIPUNCTURE: CPT

## 2023-05-22 PROCEDURE — 86364 TISS TRNSGLTMNASE EA IG CLAS: CPT

## 2023-05-22 NOTE — TELEPHONE ENCOUNTER
Patient scheduled for lab only visit.     Chloe GREEN RN, BSN, PHN - PAL (patient advocate liaison)  [facility]  [phone]

## 2023-05-25 LAB
TTG IGA SER-ACNC: 0.9 U/ML
TTG IGG SER-ACNC: <0.6 U/ML

## 2023-09-27 ENCOUNTER — E-VISIT (OUTPATIENT)
Dept: FAMILY MEDICINE | Facility: CLINIC | Age: 43
End: 2023-09-27
Payer: COMMERCIAL

## 2023-09-27 DIAGNOSIS — K64.4 EXTERNAL HEMORRHOIDS: Primary | ICD-10-CM

## 2023-09-27 PROCEDURE — 99207 PR NON-BILLABLE SERV PER CHARTING: CPT | Performed by: PHYSICIAN ASSISTANT

## 2023-09-27 RX ORDER — HYDROCORTISONE 25 MG/G
CREAM TOPICAL 2 TIMES DAILY PRN
Qty: 28 G | Refills: 0 | Status: SHIPPED | OUTPATIENT
Start: 2023-09-27

## 2023-10-05 NOTE — TELEPHONE ENCOUNTER
Diagnosis, Referred by & from: Hemorrhoids   Appt date: 12/22/2023   NOTES STATUS DETAILS   OFFICE NOTE from referring provider Internal [facility]:  9/27/23, 11/9/22 - PCC EV with MACIEJ Pelayo   OFFICE NOTE from other specialist Received / Internal [facility]:  5/6/20 - GI OV with Dr. Olaf Estrella [facility]:  4/16/20 - PCC OV with MACIEJ Zuniga   DISCHARGE SUMMARY from hospital N/A    DISCHARGE REPORT from the ER Internal [facility]:  10/7/20 - ED OV with Dr. Mora   OPERATIVE REPORT N/A    MEDICATION LIST Internal    LABS     BIOPSIES/PATHOLOGY RELATED TO DIAGNOSIS Received [facility]:  11/21/22 - Colon Biopsy (Case: [id number])   DIAGNOSTIC PROCEDURES     COLONOSCOPY Received [facility]:  11/21/22 - Colonoscopy   UPPER ENDOSCOPY (EGD) Received [facility]:  5/12/20 - EGD   IMAGING (DISC & REPORT) N/A       Records Requested  10/05/23    Facility  [facility]  Fax: [phone]   Outcome * 10/5/23 12:44 PM Faxed req to [facility] for records to be faxed to the clinic. - Abbie    * 10/26/23 9:49 AM Faxed 2nd urgent request to [facility] for recordds to be faxed to the clinic. - Abbie    * 11/16/23 10 AM Records received from [facility] and sent to [facility] to be scanned into the Chart. - Abbie

## 2023-11-21 ENCOUNTER — OFFICE VISIT (OUTPATIENT)
Dept: FAMILY MEDICINE | Facility: CLINIC | Age: 43
End: 2023-11-21
Attending: PHYSICIAN ASSISTANT
Payer: COMMERCIAL

## 2023-11-21 VITALS
BODY MASS INDEX: 22.33 KG/M2 | SYSTOLIC BLOOD PRESSURE: 120 MMHG | OXYGEN SATURATION: 98 % | TEMPERATURE: 98.1 F | RESPIRATION RATE: 12 BRPM | WEIGHT: 134 LBS | HEIGHT: 65 IN | HEART RATE: 55 BPM | DIASTOLIC BLOOD PRESSURE: 77 MMHG

## 2023-11-21 DIAGNOSIS — Z00.00 ROUTINE GENERAL MEDICAL EXAMINATION AT A HEALTH CARE FACILITY: Primary | ICD-10-CM

## 2023-11-21 DIAGNOSIS — K64.4 EXTERNAL HEMORRHOIDS: ICD-10-CM

## 2023-11-21 DIAGNOSIS — Z83.79 FAMILY HISTORY OF CELIAC DISEASE: ICD-10-CM

## 2023-11-21 PROCEDURE — 90686 IIV4 VACC NO PRSV 0.5 ML IM: CPT | Performed by: PHYSICIAN ASSISTANT

## 2023-11-21 PROCEDURE — 99396 PREV VISIT EST AGE 40-64: CPT | Mod: 25 | Performed by: PHYSICIAN ASSISTANT

## 2023-11-21 PROCEDURE — 90480 ADMN SARSCOV2 VAC 1/ONLY CMP: CPT | Performed by: PHYSICIAN ASSISTANT

## 2023-11-21 PROCEDURE — 90471 IMMUNIZATION ADMIN: CPT | Performed by: PHYSICIAN ASSISTANT

## 2023-11-21 PROCEDURE — 91320 SARSCV2 VAC 30MCG TRS-SUC IM: CPT | Performed by: PHYSICIAN ASSISTANT

## 2023-11-21 PROCEDURE — 90746 HEPB VACCINE 3 DOSE ADULT IM: CPT | Performed by: PHYSICIAN ASSISTANT

## 2023-11-21 PROCEDURE — 90472 IMMUNIZATION ADMIN EACH ADD: CPT | Performed by: PHYSICIAN ASSISTANT

## 2023-11-21 PROCEDURE — 90715 TDAP VACCINE 7 YRS/> IM: CPT | Performed by: PHYSICIAN ASSISTANT

## 2023-11-21 SDOH — HEALTH STABILITY: PHYSICAL HEALTH: ON AVERAGE, HOW MANY DAYS PER WEEK DO YOU ENGAGE IN MODERATE TO STRENUOUS EXERCISE (LIKE A BRISK WALK)?: 5 DAYS

## 2023-11-21 ASSESSMENT — ENCOUNTER SYMPTOMS
JOINT SWELLING: 0
CHILLS: 0
FREQUENCY: 1
HEMATOCHEZIA: 0
PALPITATIONS: 0
MYALGIAS: 0
WEAKNESS: 0
ARTHRALGIAS: 0
ABDOMINAL PAIN: 0
EYE PAIN: 0
NAUSEA: 0
SORE THROAT: 0
COUGH: 0
DIZZINESS: 0
HEMATURIA: 0
DIARRHEA: 0
DYSURIA: 0
NERVOUS/ANXIOUS: 1
PARESTHESIAS: 0
SHORTNESS OF BREATH: 0
FEVER: 0
HEADACHES: 0
CONSTIPATION: 0
HEARTBURN: 0

## 2023-11-21 ASSESSMENT — LIFESTYLE VARIABLES
HOW OFTEN DO YOU HAVE A DRINK CONTAINING ALCOHOL: NEVER
HOW MANY STANDARD DRINKS CONTAINING ALCOHOL DO YOU HAVE ON A TYPICAL DAY: PATIENT DOES NOT DRINK

## 2023-11-21 ASSESSMENT — SOCIAL DETERMINANTS OF HEALTH (SDOH)
DO YOU BELONG TO ANY CLUBS OR ORGANIZATIONS SUCH AS CHURCH GROUPS UNIONS, FRATERNAL OR ATHLETIC GROUPS, OR SCHOOL GROUPS?: YES
HOW OFTEN DO YOU GET TOGETHER WITH FRIENDS OR RELATIVES?: ONCE A WEEK
HOW OFTEN DO YOU ATTEND CHURCH OR RELIGIOUS SERVICES?: NEVER
HOW OFTEN DO YOU ATTENT MEETINGS OF THE CLUB OR ORGANIZATION YOU BELONG TO?: MORE THAN 4 TIMES PER YEAR
IN A TYPICAL WEEK, HOW MANY TIMES DO YOU TALK ON THE PHONE WITH FAMILY, FRIENDS, OR NEIGHBORS?: NEVER

## 2023-11-21 NOTE — PROGRESS NOTES
SUBJECTIVE:   livia is a 43 year old, presenting for the following:  Rectal Problem, Physical, and Imm/Inj (Tdap, flu, and covid vaccines)        11/21/2023     3:40 PM   Additional Questions   Roomed by Gianna MARTÍNEZ       Healthy Habits:     Getting at least 3 servings of Calcium per day:  Yes    Bi-annual eye exam:  Yes    Dental care twice a year:  Yes    Sleep apnea or symptoms of sleep apnea:  None    Diet:  Gluten-free/reduced    Frequency of exercise:  4-5 days/week    Duration of exercise:  45-60 minutes    Taking medications regularly:  Not Applicable    Medication side effects:  None    Additional concerns today:  Yes      Today's PHQ-2 Score:       11/21/2023     3:17 PM   PHQ-2 ( 1999 Pfizer)   Q1: Little interest or pleasure in doing things 0   Q2: Feeling down, depressed or hopeless 1   PHQ-2 Score 1   Q1: Little interest or pleasure in doing things Not at all   Q2: Feeling down, depressed or hopeless Several days   PHQ-2 Score 1       Hemorrhoids  Onset: 4-5 yrs, sx the worse  Description:   Pain: YES- the cream has helped not much pain now  Itching: YES- only when exercising again   Accompanying Signs & Symptoms:  Blood streaked toilet paper: YES  Blood in stool: YES- sometimes   Changes in stool pattern: no   History:   Any previous GI studies done:none  Family History of colon cancer: no   Precipitating factors:   Exercise   Alleviating factors:  None    Therapies Tried and outcome: hydrocortisone cream   Have you ever done Advance Care Planning? (For example, a Health Directive, POLST, or a discussion with a medical provider or your loved ones about your wishes): No, advance care planning information given to patient to review.  Patient declined advance care planning discussion at this time.    Social History     Tobacco Use    Smoking status: Former     Packs/day: 1.00     Years: 10.00     Additional pack years: 0.00     Total pack years: 10.00     Types: Cigarettes     Quit date: 5/1/2007     Years  since quittin.5    Smokeless tobacco: Never   Substance Use Topics    Alcohol use: Not Currently           2023     3:17 PM   Alcohol Use   Prescreen: >3 drinks/day or >7 drinks/week? No          No data to display                Last PSA:   Prostate Specific Antigen Screen   Date Value Ref Range Status   2022 0.75 0.00 - 4.00 ug/L Final       Reviewed orders with patient. Reviewed health maintenance and updated orders accordingly - Yes  BP Readings from Last 3 Encounters:   23 120/77   22 120/76   22 118/78    Wt Readings from Last 3 Encounters:   23 60.8 kg (134 lb)   22 64.2 kg (141 lb 9.6 oz)   22 65.2 kg (143 lb 12.8 oz)                  Patient Active Problem List   Diagnosis    Subacute cough     Past Surgical History:   Procedure Laterality Date    HERNIA REPAIR         Social History     Tobacco Use    Smoking status: Former     Packs/day: 1.00     Years: 10.00     Additional pack years: 0.00     Total pack years: 10.00     Types: Cigarettes     Quit date: 2007     Years since quittin.5    Smokeless tobacco: Never   Substance Use Topics    Alcohol use: Not Currently     Family History   Problem Relation Age of Onset    Hypertension Father     Prostate Cancer Maternal Grandfather 50    Cancer Paternal Grandmother         Lymphoma    Diabetes Paternal Grandmother          Current Outpatient Medications   Medication Sig Dispense Refill    hydrocortisone, Perianal, (HYDROCORTISONE) 2.5 % cream Place rectally 2 times daily as needed for hemorrhoids For max of 2 consecutive weeks. 28 g 0     Allergies   Allergen Reactions    Amoxicillin Difficulty breathing and Rash     Recent Labs   Lab Test 22  1357 10/07/20  1954 20  2225   *  --   --    HDL 52  --   --    TRIG 120  --   --    ALT 35 21 25   CR 0.82 1.05 0.98   GFRESTIMATED >90 87 >90   GFRESTBLACK  --  >90 >90   POTASSIUM 4.0 3.5 3.6        Reviewed and updated as needed this  "visit by clinical staff   Tobacco  Allergies  Meds  Problems  Med Hx  Surg Hx  Fam Hx          Reviewed and updated as needed this visit by Provider   Tobacco  Allergies  Meds  Problems  Med Hx  Surg Hx  Fam Hx         History reviewed. No pertinent past medical history.   Past Surgical History:   Procedure Laterality Date    HERNIA REPAIR         Review of Systems   Constitutional:  Negative for chills and fever.   HENT:  Negative for congestion, ear pain, hearing loss and sore throat.    Eyes:  Negative for pain and visual disturbance.   Respiratory:  Negative for cough and shortness of breath.    Cardiovascular:  Negative for chest pain, palpitations and peripheral edema.   Gastrointestinal:  Negative for abdominal pain, constipation, diarrhea, heartburn, hematochezia and nausea.   Genitourinary:  Positive for frequency. Negative for dysuria, genital sores, hematuria, impotence, penile discharge and urgency.   Musculoskeletal:  Negative for arthralgias, joint swelling and myalgias.   Skin:  Negative for rash.   Neurological:  Negative for dizziness, weakness, headaches and paresthesias.   Psychiatric/Behavioral:  Negative for mood changes. The patient is nervous/anxious.          OBJECTIVE:   /77 (BP Location: Right arm, Patient Position: Chair, Cuff Size: Adult Regular)   Pulse 55   Temp 98.1  F (36.7  C) (Oral)   Resp 12   Ht 1.638 m (5' 4.5\")   Wt 60.8 kg (134 lb)   SpO2 98%   BMI 22.65 kg/m      Physical Exam  GENERAL: healthy, alert and no distress  EYES: Eyes grossly normal to inspection, PERRL and conjunctivae and sclerae normal  HENT: ear canals and TM's normal, nose and mouth without ulcers or lesions  NECK: no adenopathy, no asymmetry, masses, or scars and thyroid normal to palpation  RESP: lungs clear to auscultation - no rales, rhonchi or wheezes  CV: regular rate and rhythm, normal S1 S2, no S3 or S4, no murmur, click or rub, no peripheral edema and peripheral pulses " strong  ABDOMEN: soft, nontender, no hepatosplenomegaly, no masses and bowel sounds normal  MS: no gross musculoskeletal defects noted, no edema  SKIN: no suspicious lesions or rashes  NEURO: Normal strength and tone, mentation intact and speech normal  PSYCH: mentation appears normal, affect normal/bright  LYMPH: no cervical adenopathy    Diagnostic Test Results:  none     ASSESSMENT/PLAN:   (Z00.00) Routine general medical examination at a health care facility  (primary encounter diagnosis)  Comment: stable exam. Fasting lab only needed.   Plan: Lipid panel reflex to direct LDL Fasting,         Comprehensive metabolic panel (BMP + Alb, Alk         Phos, ALT, AST, Total. Bili, TP)            (Z83.79) Family history of celiac disease  Comment: negative screening but did not have iga screened. Will check this.   Plan: IgA            (K64.4) External hemorrhoids  Comment: improved with anusol. Seeing colorectal in 1 month.   Plan:     Patient has been advised of split billing requirements and indicates understanding: Yes      COUNSELING:   Reviewed preventive health counseling, as reflected in patient instructions       Regular exercise       Healthy diet/nutrition       Immunizations  Vaccinated for: Covid-19, Hepatitis B, and Influenza          He reports that he quit smoking about 16 years ago. His smoking use included cigarettes. He has a 10.00 pack-year smoking history. He has never used smokeless tobacco.      HUNG Padilla [facility]

## 2023-12-20 ENCOUNTER — ALLIED HEALTH/NURSE VISIT (OUTPATIENT)
Dept: FAMILY MEDICINE | Facility: CLINIC | Age: 43
End: 2023-12-20
Payer: COMMERCIAL

## 2023-12-20 DIAGNOSIS — Z23 NEED FOR HEPATITIS B VACCINATION: Primary | ICD-10-CM

## 2023-12-20 PROCEDURE — 90746 HEPB VACCINE 3 DOSE ADULT IM: CPT

## 2023-12-20 PROCEDURE — 99207 PR NO CHARGE NURSE ONLY: CPT

## 2023-12-20 PROCEDURE — 90471 IMMUNIZATION ADMIN: CPT

## 2023-12-20 NOTE — PROGRESS NOTES
Prior to immunization administration, verified patients identity using patient s name and date of birth. Please see Immunization Activity for additional information.     Screening Questionnaire for Adult Immunization    Are you sick today?   No   Do you have allergies to medications, food, a vaccine component or latex?   No   Have you ever had a serious reaction after receiving a vaccination?   No   Do you have a long-term health problem with heart, lung, kidney, or metabolic disease (e.g., diabetes), asthma, a blood disorder, no spleen, complement component deficiency, a cochlear implant, or a spinal fluid leak?  Are you on long-term aspirin therapy?   No   Do you have cancer, leukemia, HIV/AIDS, or any other immune system problem?   No   Do you have a parent, brother, or sister with an immune system problem?   No   In the past 3 months, have you taken medications that affect  your immune system, such as prednisone, other steroids, or anticancer drugs; drugs for the treatment of rheumatoid arthritis, Crohn s disease, or psoriasis; or have you had radiation treatments?   No   Have you had a seizure, or a brain or other nervous system problem?   No   During the past year, have you received a transfusion of blood or blood    products, or been given immune (gamma) globulin or antiviral drug?   No   For women: Are you pregnant or is there a chance you could become       pregnant during the next month?   No   Have you received any vaccinations in the past 4 weeks?   No     Immunization questionnaire answers were all negative.    I have reviewed the following standing orders:   This patient is due and qualifies for the Hepatitis B vaccine.    Click here for Hepatitis B Standing Order    I have reviewed the vaccines inclusion and exclusion criteria; No concerns regarding eligibility.     Patient instructed to remain in clinic for 15 minutes afterwards, and to report any adverse reactions.     Screening performed by Alice  ALPESH Woodson, QUIN on 12/20/2023 at 8:45 AM.

## 2023-12-22 ENCOUNTER — OFFICE VISIT (OUTPATIENT)
Dept: SURGERY | Facility: CLINIC | Age: 43
End: 2023-12-22
Attending: PHYSICIAN ASSISTANT
Payer: COMMERCIAL

## 2023-12-22 ENCOUNTER — PRE VISIT (OUTPATIENT)
Dept: SURGERY | Facility: CLINIC | Age: 43
End: 2023-12-22

## 2023-12-22 VITALS — OXYGEN SATURATION: 97 % | HEART RATE: 62 BPM | DIASTOLIC BLOOD PRESSURE: 93 MMHG | SYSTOLIC BLOOD PRESSURE: 133 MMHG

## 2023-12-22 DIAGNOSIS — K62.5 RECTAL BLEEDING: ICD-10-CM

## 2023-12-22 DIAGNOSIS — K64.8 INTERNAL HEMORRHOID: Primary | ICD-10-CM

## 2023-12-22 PROCEDURE — 99202 OFFICE O/P NEW SF 15 MIN: CPT | Mod: 25

## 2023-12-22 PROCEDURE — 46600 DIAGNOSTIC ANOSCOPY SPX: CPT

## 2023-12-22 ASSESSMENT — PAIN SCALES - GENERAL: PAINLEVEL: NO PAIN (0)

## 2023-12-22 NOTE — PROGRESS NOTES
Colon and Rectal Surgery Consult Clinic Note    Date: 2023     Referring provider:  Maxime Rojo PA-C  [address] [place] [address]     RE: Leo Gabriel  : 1980  EUGENIA: 2023    Leo Gabriel is a very pleasant 43 year old male here with concerns for rectal bleeding.    Leo reports intermittent rectal bleeding with bowel movements for about 5 years now. It overall did not really bother him until a few months ago when he was hiking for a month. He had significantly more rectal bleeding. He also reports prolapsing tissue that worsened during his hike too, and it would not reduce while he was hiking. Usually it does spontaneously reduce, and it has been lately especially after using hydrocortisone cream. He typically has 2 bowel movements daily, the first is soft formed and the one after is loose.     Colonoscopy 2022 with 2 tubular adenomas, sigmoid diverticulosis, and normal random biopsies. No family history of colorectal cancer. He notes his dad had issues with hemorrhoids and needed banding.    Physical Examination: Exam was chaperoned by David Zhu, EMT-P   BP (!) 133/93 (BP Location: Left arm, Patient Position: Sitting, Cuff Size: Adult Large)   Pulse 62   SpO2 97%   General: alert, oriented, in no acute distress, sitting comfortably  Perianal external examination:  Perianal skin: Intact with no excoriation or lichenification.  Lesions: No evidence of an external lesion, nodularity, or induration in the perianal region.  Eversion of buttocks: There was not evidence of an anal fissure. Details: N/A.  Skin tags or external hemorrhoids: Yes: small hemorrhoidal skin tag left lateral.    Digital rectal examination: Was performed.   Sphincter tone: Good.  Palpable lesions: No.  Prostate: Not assessed.  Other: None.    Anoscopy: Was performed.   Hemorrhoids: Yes. Moderate internal hemorrhoids RP and LL  Lesions: No    Assessment/Plan: Leo Gabriel is a 43 year old  male with symptomatic internal hemorrhoids. We discussed general management options. Start a daily fiber supplement. He is interested in banding but would like to hold off today to avoid discomfort over the holidays. He would like both banded at the same time. I am happy to do this but cautioned him that he may have more discomfort afterwards. Return to clinic for banding as needed. Patient's questions were answered to his stated satisfaction and he is in agreement with this plan.     Medical history:  No past medical history on file.    Surgical history:  Past Surgical History:   Procedure Laterality Date    HERNIA REPAIR         Problem list:    Patient Active Problem List    Diagnosis Date Noted    Subacute cough 2022     Priority: Medium       Medications:  Current Outpatient Medications   Medication Sig Dispense Refill    hydrocortisone, Perianal, (HYDROCORTISONE) 2.5 % cream Place rectally 2 times daily as needed for hemorrhoids For max of 2 consecutive weeks. 28 g 0       Allergies:  Allergies   Allergen Reactions    Amoxicillin Difficulty breathing and Rash       Family history:  Family History   Problem Relation Age of Onset    Hypertension Father     Prostate Cancer Maternal Grandfather 50    Cancer Paternal Grandmother         Lymphoma    Diabetes Paternal Grandmother        Social history:  Social History     Tobacco Use    Smoking status: Former     Packs/day: 1.00     Years: 10.00     Additional pack years: 0.00     Total pack years: 10.00     Types: Cigarettes     Quit date: 2007     Years since quittin.6    Smokeless tobacco: Never   Substance Use Topics    Alcohol use: Not Currently    Marital status: .  Occupation: IT for [employer].    Nursing Notes:   David Zhu EMT  2023  8:04 AM  Signed  Chief Complaint   Patient presents with    Consult       Vitals:    23 0800   BP: (!) 133/93   BP Location: Left arm   Patient Position: Sitting   Cuff Size: Adult  Large   Pulse: 62   SpO2: 97%       There is no height or weight on file to calculate BMI.    David Zhu EMT-P       25 minutes spent on the date of encounter performing chart review, history and exam, documentation and further activities as noted above with an additional 2 minutes for anoscopy.     ----------------------------------------------------  Amparo Fuentes PA-C  Colon and Rectal Surgery   [facility]

## 2023-12-22 NOTE — LETTER
2023       RE: Leo Gabriel  [address] [facility] [address]       Dear Colleague,    Thank you for referring your patient, Leo Gabriel, to the [facility] COLON AND RECTAL SURGERY CLINIC [place] at [facility]. Please see a copy of my visit note below.    Colon and Rectal Surgery Consult Clinic Note    Date: 2023     Referring provider:  Maxime Rojo PA-C  [id number] [place] [address]     RE: Leo Gabriel  : 1980  EUGENIA: 2023    Leo Gabriel is a very pleasant 43 year old male here with concerns for rectal bleeding.    Leo reports intermittent rectal bleeding with bowel movements for about 5 years now. It overall did not really bother him until a few months ago when he was hiking for a month. He had significantly more rectal bleeding. He also reports prolapsing tissue that worsened during his hike too, and it would not reduce while he was hiking. Usually it does spontaneously reduce, and it has been lately especially after using hydrocortisone cream. He typically has 2 bowel movements daily, the first is soft formed and the one after is loose.     Colonoscopy 2022 with 2 tubular adenomas, sigmoid diverticulosis, and normal random biopsies. No family history of colorectal cancer. He notes his dad had issues with hemorrhoids and needed banding.    Physical Examination: Exam was chaperoned by David Zhu, EMT-P   BP (!) 133/93 (BP Location: Left arm, Patient Position: Sitting, Cuff Size: Adult Large)   Pulse 62   SpO2 97%   General: alert, oriented, in no acute distress, sitting comfortably  Perianal external examination:  Perianal skin: Intact with no excoriation or lichenification.  Lesions: No evidence of an external lesion, nodularity, or induration in the perianal region.  Eversion of buttocks: There was not evidence of an anal fissure. Details: N/A.  Skin tags or external hemorrhoids:  Yes: small hemorrhoidal skin tag left lateral.    Digital rectal examination: Was performed.   Sphincter tone: Good.  Palpable lesions: No.  Prostate: Not assessed.  Other: None.    Anoscopy: Was performed.   Hemorrhoids: Yes. Moderate internal hemorrhoids RP and LL  Lesions: No    Assessment/Plan: Leo Gabriel is a 43 year old male with symptomatic internal hemorrhoids. We discussed general management options. Start a daily fiber supplement. He is interested in banding but would like to hold off today to avoid discomfort over the holidays. He would like both banded at the same time. I am happy to do this but cautioned him that he may have more discomfort afterwards. Return to clinic for banding as needed. Patient's questions were answered to his stated satisfaction and he is in agreement with this plan.     Medical history:  No past medical history on file.    Surgical history:  Past Surgical History:   Procedure Laterality Date    HERNIA REPAIR         Problem list:    Patient Active Problem List    Diagnosis Date Noted    Subacute cough 2022     Priority: Medium       Medications:  Current Outpatient Medications   Medication Sig Dispense Refill    hydrocortisone, Perianal, (HYDROCORTISONE) 2.5 % cream Place rectally 2 times daily as needed for hemorrhoids For max of 2 consecutive weeks. 28 g 0       Allergies:  Allergies   Allergen Reactions    Amoxicillin Difficulty breathing and Rash       Family history:  Family History   Problem Relation Age of Onset    Hypertension Father     Prostate Cancer Maternal Grandfather 50    Cancer Paternal Grandmother         Lymphoma    Diabetes Paternal Grandmother        Social history:  Social History     Tobacco Use    Smoking status: Former     Packs/day: 1.00     Years: 10.00     Additional pack years: 0.00     Total pack years: 10.00     Types: Cigarettes     Quit date: 2007     Years since quittin.6    Smokeless tobacco: Never   Substance Use Topics     Alcohol use: Not Currently    Marital status: .  Occupation: IT for [employer].    Nursing Notes:   David Zhu, EMT  12/22/2023  8:04 AM  Signed  Chief Complaint   Patient presents with    Consult       Vitals:    12/22/23 0800   BP: (!) 133/93   BP Location: Left arm   Patient Position: Sitting   Cuff Size: Adult Large   Pulse: 62   SpO2: 97%       There is no height or weight on file to calculate BMI.    David Zhu EMT-P       25 minutes spent on the date of encounter performing chart review, history and exam, documentation and further activities as noted above with an additional 2 minutes for anoscopy.     ----------------------------------------------------      Again, thank you for allowing me to participate in the care of your patient.      Sincerely,    Amparo Fuentes PA-C

## 2023-12-22 NOTE — PATIENT INSTRUCTIONS
It was a pleasure to meet you today!    Today we talked about your internal hemorrhoids that are causing your symptoms. These are benign growth of the hemorrhoid tissue. Sometimes they will shrink on their own or with a fiber supplement. Some people benefit from other interventions such as hemorrhoid banding or surgery.     Start a daily fiber supplement such as Citrucel or Metamucil. Take 1 tablespoon in a large glass of water every day.    Return to clinic for banding if you are still symptomatic.

## 2023-12-22 NOTE — NURSING NOTE
Chief Complaint   Patient presents with    Consult       Vitals:    12/22/23 0800   BP: (!) 133/93   BP Location: Left arm   Patient Position: Sitting   Cuff Size: Adult Large   Pulse: 62   SpO2: 97%       There is no height or weight on file to calculate BMI.    David Zhu EMT-P

## 2023-12-28 ENCOUNTER — LAB (OUTPATIENT)
Dept: LAB | Facility: CLINIC | Age: 43
End: 2023-12-28
Payer: COMMERCIAL

## 2023-12-28 DIAGNOSIS — Z83.79 FAMILY HISTORY OF CELIAC DISEASE: ICD-10-CM

## 2023-12-28 LAB
ALBUMIN SERPL BCG-MCNC: 4.8 G/DL (ref 3.5–5.2)
ALP SERPL-CCNC: 65 U/L (ref 40–150)
ALT SERPL W P-5'-P-CCNC: 47 U/L (ref 0–70)
ANION GAP SERPL CALCULATED.3IONS-SCNC: 10 MMOL/L (ref 7–15)
AST SERPL W P-5'-P-CCNC: 40 U/L (ref 0–45)
BILIRUB SERPL-MCNC: 0.5 MG/DL
BUN SERPL-MCNC: 13.3 MG/DL (ref 6–20)
CALCIUM SERPL-MCNC: 9.7 MG/DL (ref 8.6–10)
CHLORIDE SERPL-SCNC: 100 MMOL/L (ref 98–107)
CHOLEST SERPL-MCNC: 280 MG/DL
CREAT SERPL-MCNC: 1.07 MG/DL (ref 0.67–1.17)
DEPRECATED HCO3 PLAS-SCNC: 27 MMOL/L (ref 22–29)
EGFRCR SERPLBLD CKD-EPI 2021: 87 ML/MIN/1.73M2
FASTING STATUS PATIENT QL REPORTED: YES
GLUCOSE SERPL-MCNC: 94 MG/DL (ref 70–99)
HDLC SERPL-MCNC: 52 MG/DL
LDLC SERPL CALC-MCNC: 203 MG/DL
NONHDLC SERPL-MCNC: 228 MG/DL
POTASSIUM SERPL-SCNC: 4.3 MMOL/L (ref 3.4–5.3)
PROT SERPL-MCNC: 8 G/DL (ref 6.4–8.3)
SODIUM SERPL-SCNC: 137 MMOL/L (ref 135–145)
TRIGL SERPL-MCNC: 124 MG/DL

## 2023-12-28 PROCEDURE — 80053 COMPREHEN METABOLIC PANEL: CPT

## 2023-12-28 PROCEDURE — 80061 LIPID PANEL: CPT

## 2023-12-28 PROCEDURE — 36415 COLL VENOUS BLD VENIPUNCTURE: CPT

## 2023-12-28 PROCEDURE — 82784 ASSAY IGA/IGD/IGG/IGM EACH: CPT

## 2023-12-29 LAB — IGA SERPL-MCNC: 287 MG/DL (ref 84–499)

## 2024-01-15 ENCOUNTER — OFFICE VISIT (OUTPATIENT)
Dept: SURGERY | Facility: CLINIC | Age: 44
End: 2024-01-15
Payer: COMMERCIAL

## 2024-01-15 ENCOUNTER — MYC MEDICAL ADVICE (OUTPATIENT)
Dept: FAMILY MEDICINE | Facility: CLINIC | Age: 44
End: 2024-01-15

## 2024-01-15 VITALS
HEART RATE: 61 BPM | WEIGHT: 136.5 LBS | OXYGEN SATURATION: 99 % | DIASTOLIC BLOOD PRESSURE: 73 MMHG | HEIGHT: 65 IN | SYSTOLIC BLOOD PRESSURE: 108 MMHG | BODY MASS INDEX: 22.74 KG/M2

## 2024-01-15 DIAGNOSIS — E78.5 HYPERLIPIDEMIA LDL GOAL <100: Primary | ICD-10-CM

## 2024-01-15 DIAGNOSIS — K64.8 INTERNAL HEMORRHOID: Primary | ICD-10-CM

## 2024-01-15 DIAGNOSIS — K62.5 RECTAL BLEEDING: ICD-10-CM

## 2024-01-15 PROCEDURE — 46221 LIGATION OF HEMORRHOID(S): CPT

## 2024-01-15 RX ORDER — ROSUVASTATIN CALCIUM 20 MG/1
20 TABLET, COATED ORAL DAILY
Qty: 90 TABLET | Refills: 3 | Status: SHIPPED | OUTPATIENT
Start: 2024-01-15

## 2024-01-15 ASSESSMENT — PAIN SCALES - GENERAL: PAINLEVEL: MILD PAIN (2)

## 2024-01-15 NOTE — PROGRESS NOTES
"Colon and Rectal Surgery Follow-Up Clinic Note    RE: Leo Gabriel  : 1980  EUGENIA: 1/15/2024    Leo Gabriel is a very pleasant 43 year old male here for follow-up of internal hemorrhoids.    Interval history: I met Leo almost a month ago and we discussed hemorrhoid banding. He wanted to hold off for the holidays. Since then, there has not really been any change in symptoms.     Physical Examination: Exam was chaperoned by Rafael Osei, EMT-B   /73 (BP Location: Left arm, Patient Position: Sitting, Cuff Size: Adult Regular)   Pulse 61   Ht 5' 4.5\"   Wt 136 lb 8 oz   SpO2 99%   BMI 23.07 kg/m    General: alert, oriented, in no acute distress, sitting comfortably  Perianal external examination:  Perianal skin: Intact with no excoriation or lichenification.  Lesions: No evidence of an external lesion, nodularity, or induration in the perianal region.  Eversion of buttocks: There was not evidence of an anal fissure. Details: N/A.  Skin tags or external hemorrhoids: Yes: prolapsed internal hemorrhoid, likely from left lateral column.    Digital rectal examination: Was performed.   Sphincter tone: hypertonic  Palpable lesions: No.  Prostate: Not assessed.  Other: None..    Anoscopy: Was performed.   Hemorrhoids: Yes. Large internal hemorrhoid at LL  Lesions: No.    Procedure:   After discussing the risks and benefits, the patient agreed to proceed with internal hemorrhoidal banding.    Prior to the start of the procedure and with procedural staff participation, I verbally confirmed the patient s identity using two indicators, relevant allergies, that the procedure was appropriate and matched the consent or emergent situation, and that the correct equipment/implants were available. Immediately prior to starting the procedure I conducted the Time Out with the procedural staff and re-confirmed the patient s name, procedure, and site/side. (The Joint Commission universal protocol was followed.)  " Yes    Sedation (Moderate or Deep): None    A suction hemorrhoidal  was used to place a total of 1 band(s) in the left lateral position(s).    There was no significant bleeding. The patient tolerated the procedure well.    Procedure was performed under collaborating agreement with Dr. Devin Oscar MD, Chief of the Division of Colon and Rectal Surgery      Assessment/Plan: 43 year old male with symptomatic internal hemorrhoids. We previously discussed banding and discussed it again today. See procedure note above. Avoid straining. No aspirin for 2 weeks. Follow up in 6 weeks for repeat banding if needed. Patient's questions were answered to his stated satisfaction and he is in agreement with this plan.      Medical history:  No past medical history on file.    Surgical history:  Past Surgical History:   Procedure Laterality Date    HERNIA REPAIR         Problem list:    Patient Active Problem List    Diagnosis Date Noted    Subacute cough 2022     Priority: Medium       Medications:  Current Outpatient Medications   Medication Sig Dispense Refill    hydrocortisone, Perianal, (HYDROCORTISONE) 2.5 % cream Place rectally 2 times daily as needed for hemorrhoids For max of 2 consecutive weeks. 28 g 0       Allergies:  Allergies   Allergen Reactions    Amoxicillin Difficulty breathing and Rash       Family history:  Family History   Problem Relation Age of Onset    Hypertension Father     Prostate Cancer Maternal Grandfather 50    Cancer Paternal Grandmother         Lymphoma    Diabetes Paternal Grandmother        Social history:  Social History     Tobacco Use    Smoking status: Former     Packs/day: 1.00     Years: 10.00     Additional pack years: 0.00     Total pack years: 10.00     Types: Cigarettes     Quit date: 2007     Years since quittin.7    Smokeless tobacco: Never   Substance Use Topics    Alcohol use: Not Currently     Marital status: .    Nursing Notes:   Rafael Osei   "1/15/2024 11:01 AM  Signed  Chief Complaint   Patient presents with    RECHECK     Hemorrhoids.       Vitals:    01/15/24 1056   BP: 108/73   BP Location: Left arm   Patient Position: Sitting   Cuff Size: Adult Regular   Pulse: 61   SpO2: 99%   Weight: 61.9 kg (136 lb 8 oz)   Height: 1.638 m (5' 4.5\")       Body mass index is 23.07 kg/m .    Patient reports mild anal uncomfortability (2/10).    Rafael Osei, EMT       10 minutes spent on the date of encounter performing chart review, history and exam, documentation and further activities as noted above with an additional 5 minutes for anoscopy and banding.     -----------------------------------------------------  Amparo Fuentes PA-C  Colon and Rectal Surgery  [employer]    "

## 2024-01-15 NOTE — NURSING NOTE
"Chief Complaint   Patient presents with    RECHECK     Hemorrhoids.       Vitals:    01/15/24 1056   BP: 108/73   BP Location: Left arm   Patient Position: Sitting   Cuff Size: Adult Regular   Pulse: 61   SpO2: 99%   Weight: 61.9 kg (136 lb 8 oz)   Height: 1.638 m (5' 4.5\")       Body mass index is 23.07 kg/m .    Patient reports mild anal uncomfortability (2/10).    Rafael Osei, EMT    "

## 2024-01-15 NOTE — LETTER
"1/15/2024       RE: Leo Gabriel  [address] [facility] [address]       Dear Colleague,    Thank you for referring your patient, Leo Gabriel, to the [facility] COLON AND RECTAL SURGERY CLINIC [place] at [facility]. Please see a copy of my visit note below.    Colon and Rectal Surgery Follow-Up Clinic Note    RE: Leo Gabriel  : 1980  EUGENIA: 1/15/2024    Leo Gabriel is a very pleasant 43 year old male here for follow-up of internal hemorrhoids.    Interval history: I met Leo almost a month ago and we discussed hemorrhoid banding. He wanted to hold off for the holidays. Since then, there has not really been any change in symptoms.     Physical Examination: Exam was chaperoned by Rafael Osei, EMT-B   /73 (BP Location: Left arm, Patient Position: Sitting, Cuff Size: Adult Regular)   Pulse 61   Ht 5' 4.5\"   Wt 136 lb 8 oz   SpO2 99%   BMI 23.07 kg/m    General: alert, oriented, in no acute distress, sitting comfortably  Perianal external examination:  Perianal skin: Intact with no excoriation or lichenification.  Lesions: No evidence of an external lesion, nodularity, or induration in the perianal region.  Eversion of buttocks: There was not evidence of an anal fissure. Details: N/A.  Skin tags or external hemorrhoids: Yes: prolapsed internal hemorrhoid, likely from left lateral column.    Digital rectal examination: Was performed.   Sphincter tone: hypertonic  Palpable lesions: No.  Prostate: Not assessed.  Other: None..    Anoscopy: Was performed.   Hemorrhoids: Yes. Large internal hemorrhoid at LL  Lesions: No.    Procedure:   After discussing the risks and benefits, the patient agreed to proceed with internal hemorrhoidal banding.    Prior to the start of the procedure and with procedural staff participation, I verbally confirmed the patient s identity using two indicators, relevant allergies, that the procedure was " appropriate and matched the consent or emergent situation, and that the correct equipment/implants were available. Immediately prior to starting the procedure I conducted the Time Out with the procedural staff and re-confirmed the patient s name, procedure, and site/side. (The Joint Commission universal protocol was followed.)  Yes    Sedation (Moderate or Deep): None    A suction hemorrhoidal  was used to place a total of 1 band(s) in the left lateral position(s).    There was no significant bleeding. The patient tolerated the procedure well.    Procedure was performed under collaborating agreement with Dr. Devin Oscar MD, Chief of the Division of Colon and Rectal Surgery      Assessment/Plan: 43 year old male with symptomatic internal hemorrhoids. We previously discussed banding and discussed it again today. See procedure note above. Avoid straining. No aspirin for 2 weeks. Follow up in 6 weeks for repeat banding if needed. Patient's questions were answered to his stated satisfaction and he is in agreement with this plan.      Medical history:  No past medical history on file.    Surgical history:  Past Surgical History:   Procedure Laterality Date    HERNIA REPAIR         Problem list:    Patient Active Problem List    Diagnosis Date Noted    Subacute cough 11/09/2022     Priority: Medium       Medications:  Current Outpatient Medications   Medication Sig Dispense Refill    hydrocortisone, Perianal, (HYDROCORTISONE) 2.5 % cream Place rectally 2 times daily as needed for hemorrhoids For max of 2 consecutive weeks. 28 g 0       Allergies:  Allergies   Allergen Reactions    Amoxicillin Difficulty breathing and Rash       Family history:  Family History   Problem Relation Age of Onset    Hypertension Father     Prostate Cancer Maternal Grandfather 50    Cancer Paternal Grandmother         Lymphoma    Diabetes Paternal Grandmother        Social history:  Social History     Tobacco Use    Smoking status:  "Former     Packs/day: 1.00     Years: 10.00     Additional pack years: 0.00     Total pack years: 10.00     Types: Cigarettes     Quit date: 2007     Years since quittin.7    Smokeless tobacco: Never   Substance Use Topics    Alcohol use: Not Currently     Marital status: .    Nursing Notes:   Rafael Osei  1/15/2024 11:01 AM  Signed  Chief Complaint   Patient presents with    RECHECK     Hemorrhoids.       Vitals:    01/15/24 1056   BP: 108/73   BP Location: Left arm   Patient Position: Sitting   Cuff Size: Adult Regular   Pulse: 61   SpO2: 99%   Weight: 61.9 kg (136 lb 8 oz)   Height: 1.638 m (5' 4.5\")       Body mass index is 23.07 kg/m .    Patient reports mild anal uncomfortability (2/10).    Rafael Osei, EMT       10 minutes spent on the date of encounter performing chart review, history and exam, documentation and further activities as noted above with an additional 5 minutes for anoscopy and banding.         Again, thank you for allowing me to participate in the care of your patient.      Sincerely,    Amparo Fuentes PA-C    "

## 2024-02-26 ENCOUNTER — OFFICE VISIT (OUTPATIENT)
Dept: SURGERY | Facility: CLINIC | Age: 44
End: 2024-02-26
Payer: COMMERCIAL

## 2024-02-26 VITALS — DIASTOLIC BLOOD PRESSURE: 79 MMHG | SYSTOLIC BLOOD PRESSURE: 116 MMHG | OXYGEN SATURATION: 98 % | HEART RATE: 67 BPM

## 2024-02-26 DIAGNOSIS — K64.8 PROLAPSED INTERNAL HEMORRHOIDS: Primary | ICD-10-CM

## 2024-02-26 PROCEDURE — 46221 LIGATION OF HEMORRHOID(S): CPT

## 2024-02-26 ASSESSMENT — PAIN SCALES - GENERAL: PAINLEVEL: NO PAIN (0)

## 2024-02-26 NOTE — PROGRESS NOTES
Colon and Rectal Surgery Follow-Up Clinic Note    RE: Leo Gabriel  : 1980  EUGENIA: 2024    Leo Gabriel is a very pleasant 43 year old male here for follow-up of internal hemorrhoids.    Interval history: Doing great since banding on 1/15/24. Feels like there is less prolapse. No bleeding since then. Overall he has noticed significant improvement but there is still some symptoms.    Physical Examination: Exam was chaperoned by Rafael Osei, EMT-B   /79 (BP Location: Right arm, Patient Position: Sitting, Cuff Size: Adult Regular)   Pulse 67   SpO2 98%   General: alert, oriented, in no acute distress, sitting comfortably  Perianal external examination:  Perianal skin: Intact with no excoriation or lichenification.  Lesions: No evidence of an external lesion, nodularity, or induration in the perianal region.  Eversion of buttocks: There was not evidence of an anal fissure. Details: N/A.  Skin tags or external hemorrhoids: Yes: Prolapsed hemorrhoid from left lateral.    Digital rectal examination: Was performed.   Sphincter tone: Good.  Palpable lesions: No.  Prostate: Not assessed.  Other: None..    Anoscopy: Was performed.   Hemorrhoids: Yes. Large hemorrhoid at LL  Lesions: No.    Procedure:   After discussing the risks and benefits, the patient agreed to proceed with internal hemorrhoidal banding.    Prior to the start of the procedure and with procedural staff participation, I verbally confirmed the patient s identity using two indicators, relevant allergies, that the procedure was appropriate and matched the consent or emergent situation, and that the correct equipment/implants were available. Immediately prior to starting the procedure I conducted the Time Out with the procedural staff and re-confirmed the patient s name, procedure, and site/side. (The Joint Commission universal protocol was followed.)  Yes    Sedation (Moderate or Deep): None    A suction hemorrhoidal  was used to  place a total of 1 band(s) in the left lateral position(s).    There was no significant bleeding. The patient tolerated the procedure well.    Procedure was performed under collaborating agreement with Dr. Devin Oscar MD, Chief of the Division of Colon and Rectal Surgery     Assessment/Plan: 43 year old male with symptomatic internal hemorrhoid, primarily left lateral. We discussed repeat banding today which he is interested in. See procedure note above. Return to clinic after 6 weeks for repeat banding if needed. Patient's questions were answered to his stated satisfaction and he is in agreement with this plan.     Medical history:  No past medical history on file.    Surgical history:  Past Surgical History:   Procedure Laterality Date    HERNIA REPAIR         Problem list:    Patient Active Problem List    Diagnosis Date Noted    Hyperlipidemia LDL goal <100 01/15/2024     Priority: Medium    Subacute cough 2022     Priority: Medium       Medications:  Current Outpatient Medications   Medication Sig Dispense Refill    hydrocortisone, Perianal, (HYDROCORTISONE) 2.5 % cream Place rectally 2 times daily as needed for hemorrhoids For max of 2 consecutive weeks. 28 g 0    rosuvastatin (CRESTOR) 20 MG tablet Take 1 tablet (20 mg) by mouth daily 90 tablet 3       Allergies:  Allergies   Allergen Reactions    Amoxicillin Difficulty breathing and Rash       Family history:  Family History   Problem Relation Age of Onset    Hypertension Father     Prostate Cancer Maternal Grandfather 50    Cancer Paternal Grandmother         Lymphoma    Diabetes Paternal Grandmother        Social history:  Social History     Tobacco Use    Smoking status: Former     Packs/day: 1.00     Years: 10.00     Additional pack years: 0.00     Total pack years: 10.00     Types: Cigarettes     Quit date: 2007     Years since quittin.8    Smokeless tobacco: Never   Substance Use Topics    Alcohol use: Not Currently     Marital  status: .    Nursing Notes:   Rafael Osei  2/26/2024  9:16 AM  Signed  Chief Complaint   Patient presents with    RECHECK     6 week follow-up, hemorrhoid banding.       Vitals:    02/26/24 0912   BP: 120/80   BP Location: Right arm   Patient Position: Sitting   Cuff Size: Adult Regular   Pulse: 67   SpO2: 98%       There is no height or weight on file to calculate BMI.    Rafael Osei, EMT       10 minutes spent on the date of encounter performing chart review, history and exam, documentation and further activities as noted above with an additional 5 minutes for banding.     -----------------------------------------------------  Amparo Fuentes PA-C  Colon and Rectal Surgery  [facility]

## 2024-02-26 NOTE — LETTER
2024       RE: Leo Gabriel  [address] [place] [address]     Dear Colleague,    Thank you for referring your patient, Leo Gabriel, to the [facility] COLON AND RECTAL SURGERY CLINIC [place] at [facility]. Please see a copy of my visit note below.    Colon and Rectal Surgery Follow-Up Clinic Note    RE: Leo Gabriel  : 1980  EUGENIA: 2024    Leo Gabriel is a very pleasant 43 year old male here for follow-up of internal hemorrhoids.    Interval history: Doing great since banding on 1/15/24. Feels like there is less prolapse. No bleeding since then. Overall he has noticed significant improvement but there is still some symptoms.    Physical Examination: Exam was chaperoned by Rafael Osei, EMT-B   /79 (BP Location: Right arm, Patient Position: Sitting, Cuff Size: Adult Regular)   Pulse 67   SpO2 98%   General: alert, oriented, in no acute distress, sitting comfortably  Perianal external examination:  Perianal skin: Intact with no excoriation or lichenification.  Lesions: No evidence of an external lesion, nodularity, or induration in the perianal region.  Eversion of buttocks: There was not evidence of an anal fissure. Details: N/A.  Skin tags or external hemorrhoids: Yes: Prolapsed hemorrhoid from left lateral.    Digital rectal examination: Was performed.   Sphincter tone: Good.  Palpable lesions: No.  Prostate: Not assessed.  Other: None..    Anoscopy: Was performed.   Hemorrhoids: Yes. Large hemorrhoid at LL  Lesions: No.    Procedure:   After discussing the risks and benefits, the patient agreed to proceed with internal hemorrhoidal banding.    Prior to the start of the procedure and with procedural staff participation, I verbally confirmed the patient s identity using two indicators, relevant allergies, that the procedure was appropriate and matched the consent or emergent situation, and that the correct  equipment/implants were available. Immediately prior to starting the procedure I conducted the Time Out with the procedural staff and re-confirmed the patient s name, procedure, and site/side. (The Joint Commission universal protocol was followed.)  Yes    Sedation (Moderate or Deep): None    A suction hemorrhoidal  was used to place a total of 1 band(s) in the left lateral position(s).    There was no significant bleeding. The patient tolerated the procedure well.    Procedure was performed under collaborating agreement with Dr. Devin Oscar MD, Chief of the Division of Colon and Rectal Surgery     Assessment/Plan: 43 year old male with symptomatic internal hemorrhoid, primarily left lateral. We discussed repeat banding today which he is interested in. See procedure note above. Return to clinic after 6 weeks for repeat banding if needed. Patient's questions were answered to his stated satisfaction and he is in agreement with this plan.     Medical history:  No past medical history on file.    Surgical history:  Past Surgical History:   Procedure Laterality Date    HERNIA REPAIR         Problem list:    Patient Active Problem List    Diagnosis Date Noted    Hyperlipidemia LDL goal <100 01/15/2024     Priority: Medium    Subacute cough 11/09/2022     Priority: Medium       Medications:  Current Outpatient Medications   Medication Sig Dispense Refill    hydrocortisone, Perianal, (HYDROCORTISONE) 2.5 % cream Place rectally 2 times daily as needed for hemorrhoids For max of 2 consecutive weeks. 28 g 0    rosuvastatin (CRESTOR) 20 MG tablet Take 1 tablet (20 mg) by mouth daily 90 tablet 3       Allergies:  Allergies   Allergen Reactions    Amoxicillin Difficulty breathing and Rash       Family history:  Family History   Problem Relation Age of Onset    Hypertension Father     Prostate Cancer Maternal Grandfather 50    Cancer Paternal Grandmother         Lymphoma    Diabetes Paternal Grandmother        Social  history:  Social History     Tobacco Use    Smoking status: Former     Packs/day: 1.00     Years: 10.00     Additional pack years: 0.00     Total pack years: 10.00     Types: Cigarettes     Quit date: 2007     Years since quittin.8    Smokeless tobacco: Never   Substance Use Topics    Alcohol use: Not Currently     Marital status: .    Nursing Notes:   Rafael Osei  2024  9:16 AM  Signed  Chief Complaint   Patient presents with    RECHECK     6 week follow-up, hemorrhoid banding.       Vitals:    24 0912   BP: 120/80   BP Location: Right arm   Patient Position: Sitting   Cuff Size: Adult Regular   Pulse: 67   SpO2: 98%       There is no height or weight on file to calculate BMI.    Rafael Osei, KATIE       10 minutes spent on the date of encounter performing chart review, history and exam, documentation and further activities as noted above with an additional 5 minutes for banding.     -----------------------------------------------------    Again, thank you for allowing me to participate in the care of your patient.      Sincerely,    Amparo Fuentes PA-C

## 2024-02-26 NOTE — NURSING NOTE
Chief Complaint   Patient presents with    RECHECK     6 week follow-up, hemorrhoid banding.       Vitals:    02/26/24 0912   BP: 120/80   BP Location: Right arm   Patient Position: Sitting   Cuff Size: Adult Regular   Pulse: 67   SpO2: 98%       There is no height or weight on file to calculate BMI.    Rafael Osei, EMT

## 2024-12-22 ENCOUNTER — HEALTH MAINTENANCE LETTER (OUTPATIENT)
Age: 44
End: 2024-12-22

## 2025-01-02 ENCOUNTER — HOSPITAL ENCOUNTER (EMERGENCY)
Facility: CLINIC | Age: 45
Discharge: HOME | End: 2025-01-02
Attending: EMERGENCY MEDICINE
Payer: COMMERCIAL

## 2025-01-02 ENCOUNTER — APPOINTMENT (OUTPATIENT)
Dept: CT IMAGING | Facility: CLINIC | Age: 45
End: 2025-01-02
Attending: EMERGENCY MEDICINE
Payer: COMMERCIAL

## 2025-01-02 VITALS
RESPIRATION RATE: 18 BRPM | TEMPERATURE: 97.8 F | OXYGEN SATURATION: 97 % | HEIGHT: 66 IN | HEART RATE: 74 BPM | WEIGHT: 139.55 LBS | BODY MASS INDEX: 22.43 KG/M2 | DIASTOLIC BLOOD PRESSURE: 80 MMHG | SYSTOLIC BLOOD PRESSURE: 127 MMHG

## 2025-01-02 DIAGNOSIS — K52.9 PROCTOCOLITIS: ICD-10-CM

## 2025-01-02 DIAGNOSIS — R10.32 LEFT LOWER QUADRANT ABDOMINAL PAIN: ICD-10-CM

## 2025-01-02 DIAGNOSIS — R91.1 PULMONARY NODULE: ICD-10-CM

## 2025-01-02 LAB
ALBUMIN UR-MCNC: NEGATIVE MG/DL
ANION GAP SERPL CALCULATED.3IONS-SCNC: 12 MMOL/L (ref 7–15)
APPEARANCE UR: CLEAR
BASOPHILS # BLD AUTO: 0 10E3/UL (ref 0–0.2)
BASOPHILS NFR BLD AUTO: 1 %
BILIRUB UR QL STRIP: NEGATIVE
BUN SERPL-MCNC: 8.3 MG/DL (ref 6–20)
CALCIUM SERPL-MCNC: 10 MG/DL (ref 8.8–10.4)
CHLORIDE SERPL-SCNC: 104 MMOL/L (ref 98–107)
COLOR UR AUTO: NORMAL
CREAT SERPL-MCNC: 1.01 MG/DL (ref 0.67–1.17)
EGFRCR SERPLBLD CKD-EPI 2021: >90 ML/MIN/1.73M2
EOSINOPHIL # BLD AUTO: 0 10E3/UL (ref 0–0.7)
EOSINOPHIL NFR BLD AUTO: 1 %
ERYTHROCYTE [DISTWIDTH] IN BLOOD BY AUTOMATED COUNT: 12.1 % (ref 10–15)
GLUCOSE SERPL-MCNC: 105 MG/DL (ref 70–99)
GLUCOSE UR STRIP-MCNC: NEGATIVE MG/DL
HCO3 SERPL-SCNC: 24 MMOL/L (ref 22–29)
HCT VFR BLD AUTO: 50.7 % (ref 40–53)
HGB BLD-MCNC: 17.2 G/DL (ref 13.3–17.7)
HGB UR QL STRIP: NEGATIVE
HOLD SPECIMEN: NORMAL
HOLD SPECIMEN: NORMAL
IMM GRANULOCYTES # BLD: 0 10E3/UL
IMM GRANULOCYTES NFR BLD: 0 %
KETONES UR STRIP-MCNC: NEGATIVE MG/DL
LEUKOCYTE ESTERASE UR QL STRIP: NEGATIVE
LYMPHOCYTES # BLD AUTO: 1.6 10E3/UL (ref 0.8–5.3)
LYMPHOCYTES NFR BLD AUTO: 31 %
MCH RBC QN AUTO: 30.6 PG (ref 26.5–33)
MCHC RBC AUTO-ENTMCNC: 33.9 G/DL (ref 31.5–36.5)
MCV RBC AUTO: 90 FL (ref 78–100)
MONOCYTES # BLD AUTO: 0.4 10E3/UL (ref 0–1.3)
MONOCYTES NFR BLD AUTO: 8 %
NEUTROPHILS # BLD AUTO: 3 10E3/UL (ref 1.6–8.3)
NEUTROPHILS NFR BLD AUTO: 59 %
NITRATE UR QL: NEGATIVE
NRBC # BLD AUTO: 0 10E3/UL
NRBC BLD AUTO-RTO: 0 /100
PH UR STRIP: 7 [PH] (ref 5–7)
PLATELET # BLD AUTO: 245 10E3/UL (ref 150–450)
POTASSIUM SERPL-SCNC: 3.9 MMOL/L (ref 3.4–5.3)
RBC # BLD AUTO: 5.63 10E6/UL (ref 4.4–5.9)
RBC URINE: 0 /HPF
SODIUM SERPL-SCNC: 140 MMOL/L (ref 135–145)
SP GR UR STRIP: 1.01 (ref 1–1.03)
UROBILINOGEN UR STRIP-MCNC: NORMAL MG/DL
WBC # BLD AUTO: 5.1 10E3/UL (ref 4–11)
WBC URINE: 0 /HPF

## 2025-01-02 PROCEDURE — 80048 BASIC METABOLIC PNL TOTAL CA: CPT | Performed by: EMERGENCY MEDICINE

## 2025-01-02 PROCEDURE — 85004 AUTOMATED DIFF WBC COUNT: CPT | Performed by: EMERGENCY MEDICINE

## 2025-01-02 PROCEDURE — 250N000011 HC RX IP 250 OP 636: Performed by: EMERGENCY MEDICINE

## 2025-01-02 PROCEDURE — 81001 URINALYSIS AUTO W/SCOPE: CPT | Performed by: EMERGENCY MEDICINE

## 2025-01-02 PROCEDURE — 85025 COMPLETE CBC W/AUTO DIFF WBC: CPT | Performed by: EMERGENCY MEDICINE

## 2025-01-02 PROCEDURE — 250N000009 HC RX 250: Performed by: EMERGENCY MEDICINE

## 2025-01-02 PROCEDURE — 36415 COLL VENOUS BLD VENIPUNCTURE: CPT | Performed by: EMERGENCY MEDICINE

## 2025-01-02 PROCEDURE — 74177 CT ABD & PELVIS W/CONTRAST: CPT

## 2025-01-02 RX ORDER — DICYCLOMINE HCL 20 MG
20 TABLET ORAL 4 TIMES DAILY PRN
Qty: 30 TABLET | Refills: 0 | Status: SHIPPED | OUTPATIENT
Start: 2025-01-02 | End: 2025-01-12

## 2025-01-02 RX ORDER — IOPAMIDOL 755 MG/ML
500 INJECTION, SOLUTION INTRAVASCULAR ONCE
Status: COMPLETED | OUTPATIENT
Start: 2025-01-02 | End: 2025-01-02

## 2025-01-02 RX ADMIN — IOPAMIDOL 71 ML: 755 INJECTION, SOLUTION INTRAVENOUS at 12:41

## 2025-01-02 RX ADMIN — SODIUM CHLORIDE 58 ML: 9 INJECTION, SOLUTION INTRAVENOUS at 12:41

## 2025-01-02 ASSESSMENT — COLUMBIA-SUICIDE SEVERITY RATING SCALE - C-SSRS
2. HAVE YOU ACTUALLY HAD ANY THOUGHTS OF KILLING YOURSELF IN THE PAST MONTH?: NO
1. IN THE PAST MONTH, HAVE YOU WISHED YOU WERE DEAD OR WISHED YOU COULD GO TO SLEEP AND NOT WAKE UP?: NO
6. HAVE YOU EVER DONE ANYTHING, STARTED TO DO ANYTHING, OR PREPARED TO DO ANYTHING TO END YOUR LIFE?: NO

## 2025-01-02 ASSESSMENT — ACTIVITIES OF DAILY LIVING (ADL)
ADLS_ACUITY_SCORE: 41

## 2025-01-02 NOTE — ED PROVIDER NOTES
"  Emergency Department Note      History of Present Illness     Chief Complaint   Abdominal Pain      HPI   Leo Gabriel is a 44 year old male who presents to the ER with ongoing abdominal pain.  Patient reports has had discomfort in the left lower quadrant for the last several days.  Has not gotten better.  Reports has intermittent GI issues.  Has had colonoscopy that showed diverticula but no signs of diverticulitis.  Stools have been mucousy but otherwise normal.  No blood in the stools.  No vomiting or fevers.  No testicular pain, pain with urination or blood in the urine.    Independent Historian   None    Review of External Notes   Reviewed visit 2/26/2024: Surgery prolapse internal hemorrhoids.    Past Medical History     Medical History and Problem List   No past medical history on file.    Medications   dicyclomine (BENTYL) 20 MG tablet  hydrocortisone, Perianal, (HYDROCORTISONE) 2.5 % cream  rosuvastatin (CRESTOR) 20 MG tablet        Surgical History   Past Surgical History:   Procedure Laterality Date    HERNIA REPAIR         Physical Exam     Patient Vitals for the past 24 hrs:   BP Temp Temp src Pulse Resp SpO2 Height Weight   01/02/25 1345 127/80 -- -- 74 -- 97 % -- --   01/02/25 1330 124/86 -- -- 59 -- 100 % -- --   01/02/25 1315 121/84 -- -- 58 -- 100 % -- --   01/02/25 1300 123/84 -- -- 57 -- 100 % -- --   01/02/25 1230 126/80 -- -- 69 -- 97 % -- --   01/02/25 1215 122/80 -- -- 60 -- 100 % -- --   01/02/25 1200 115/79 -- -- 61 -- 98 % -- --   01/02/25 1116 120/75 -- -- 59 -- 99 % -- --   01/02/25 1115 120/75 -- -- 59 -- 98 % -- --   01/02/25 1021 (!) 128/97 97.8  F (36.6  C) Oral 64 18 100 % 1.676 m (5' 6\") 63.3 kg (139 lb 8.8 oz)     Physical Exam  General: Resting on the bed.  Head: No obvious trauma to head.  Ears, Nose, Throat:  External ears normal.  Nose normal.    Neck: Normal range of motion.  Neck supple.   CV: Regular rate and rhythm.  No murmurs.      Respiratory: Effort normal and " breath sounds normal.  No wheezing or crackles.   Gastrointestinal: Soft.  No distension. There is LLQ tenderness.  There is no rigidity, no rebound and no guarding.   Musculoskeletal: No cva tenderness   Neuro: Alert. Moving all extremities appropriately.  Normal speech.    Skin: Skin is warm and dry.  No rash noted.     Diagnostics     Lab Results   Labs Ordered and Resulted from Time of ED Arrival to Time of ED Departure   BASIC METABOLIC PANEL - Abnormal       Result Value    Sodium 140      Potassium 3.9      Chloride 104      Carbon Dioxide (CO2) 24      Anion Gap 12      Urea Nitrogen 8.3      Creatinine 1.01      GFR Estimate >90      Calcium 10.0      Glucose 105 (*)    ROUTINE UA WITH MICROSCOPIC REFLEX TO CULTURE - Normal    Color Urine Light Yellow      Appearance Urine Clear      Glucose Urine Negative      Bilirubin Urine Negative      Ketones Urine Negative      Specific Gravity Urine 1.008      Blood Urine Negative      pH Urine 7.0      Protein Albumin Urine Negative      Urobilinogen Urine Normal      Nitrite Urine Negative      Leukocyte Esterase Urine Negative      RBC Urine 0      WBC Urine 0     CBC WITH PLATELETS AND DIFFERENTIAL    WBC Count 5.1      RBC Count 5.63      Hemoglobin 17.2      Hematocrit 50.7      MCV 90      MCH 30.6      MCHC 33.9      RDW 12.1      Platelet Count 245      % Neutrophils 59      % Lymphocytes 31      % Monocytes 8      % Eosinophils 1      % Basophils 1      % Immature Granulocytes 0      NRBCs per 100 WBC 0      Absolute Neutrophils 3.0      Absolute Lymphocytes 1.6      Absolute Monocytes 0.4      Absolute Eosinophils 0.0      Absolute Basophils 0.0      Absolute Immature Granulocytes 0.0      Absolute NRBCs 0.0         Imaging   CT Abdomen Pelvis w Contrast   Final Result   IMPRESSION:    1.  Mild diffuse wall thickening of the sigmoid colon and rectum   compatible with a proctocolitis which may be infectious or   inflammatory in etiology.   2.  Few  pulmonary nodules of the lung bases measuring less than 6 mm.   See recommendations below.      REFERENCE:   Guidelines for Management of Incidental Pulmonary Nodules Detected on   CT Images: From the Fleischner Society 2017.    Guidelines apply to incidental nodules in patients who are 35 years or   older.   Guidelines do not apply to lung cancer screening, patients with   immunosuppression, or patients with known primary cancer.      MULTIPLE NODULES   Nodule size <6 mm   Low-risk patients: No follow-up needed.   High-risk patients: Optional follow-up at 12 months.      SRUTHI CARD MD            SYSTEM ID:  [id number]          Independent Interpretation   None    ED Course      Medications Administered   Medications   iopamidol (ISOVUE-370) solution 500 mL (71 mLs Intravenous $Given 1/2/25 1241)   CT scan flush (58 mLs Intravenous $Given 1/2/25 1241)       Procedures   Procedures     Discussion of Management   None    ED Course   ED Course as of 01/02/25 1524   Thu Jan 02, 2025   1342 I reassessed the patient.  Continues to feel okay.  Plan for discharge.   1521 I called patient to relay pulmonary nodules and follow up with PCP.  He voices understanding        Additional Documentation  None    Medical Decision Making / Diagnosis     CMS Diagnoses: None    MIPS       None    [facility]   Leo Gabriel is a 44 year old male who presents to the Emergency Department with abdominal discomfort.  Vital signs are reassuring.  Broad differential was considered including not limited to colitis, obstruction, perforation, abscess, diverticulitis, dehydration, etc.  CBC without leukocytosis or anemia.  BMP without acute electrolyte, metabolic or renal dysfunction.  Urinalysis without obvious signs of infection.  Patient having lower left quadrant tenderness.  CT scan was obtained showing mild diffuse wall thickening of the sigmoid colon and rectum compatible with proctocolitis which may be infectious or inflammatory.  No bloody  stools.  Patient also noted to have pulmonary nodules recommend outpatient follow-up with primary doctor.  Will refer patient to gastroenterology.  Stool kit given for stool sample.  Recommend supportive care.  Bentyl as needed for discomfort.  Return if intractable pain, fevers, nausea vomiting etc.  Follow-up with GI.  Patient discharged.    Disposition   The patient was discharged.     Diagnosis     ICD-10-CM    1. Proctocolitis  K52.9 Enteric Bacteria and Virus Panel by MACK Stool     C. difficile Toxin B PCR with reflex to C. difficile Antigen and Toxins A/B EIA      2. Left lower quadrant abdominal pain  R10.32 Enteric Bacteria and Virus Panel by MACK Stool     C. difficile Toxin B PCR with reflex to C. difficile Antigen and Toxins A/B EIA      3. Pulmonary nodule  R91.1            Discharge Medications   Discharge Medication List as of 1/2/2025  1:44 PM        START taking these medications    Details   dicyclomine (BENTYL) 20 MG tablet Take 1 tablet (20 mg) by mouth 4 times daily as needed (abd pain)., Disp-30 tablet, R-0, E-Prescribe               MD Isiah Pearl Jennifer L, MD  01/02/25 4438

## 2025-01-02 NOTE — ED TRIAGE NOTES
Pt presents to ED with left sided abdominal pain. Started about 1 week ago and getting worse.    Had a colonoscopy 1-2 years ago that showed diverticulum. Denies history of diverticulitis.

## 2025-01-02 NOTE — DISCHARGE INSTRUCTIONS
Return to the ED if you are unable to tolerate fluids, intractable nausea or vomiting, severe abdominal pain, fevers >101 or other acute changes.  Please follow up with your PCP in 2-3 days.      Please follow-up with gastroenterology.    If you are able to submit a stool sample without be helpful to sort through if there is evidence of viral or bacterial illness causing the inflammation of the colon.    Discharge Instructions  Abdominal Pain    Abdominal pain (belly pain) can be caused by many things. Your evaluation today does not show the exact cause for your pain. Your provider today has decided that it is unlikely your pain is due to a life threatening problem, or a problem requiring surgery or hospital admission. Sometimes those problems cannot be found right away, so it is very important that you follow up as directed.  Sometimes only the changes which occur over time allow the cause of your pain to be found.    Generally, every Emergency Department visit should have a follow-up clinic visit with either a primary or a specialty clinic/provider. Please follow-up as instructed by your emergency provider today. With abdominal pain, we often recommend very close follow-up, such as the following day.    ADULTS:  Return to the Emergency Department right away if:    You get an oral temperature above 102oF or as directed by your provider.  You have blood in your stools. This may be bright red or appear as black, tarry stools.    You keep vomiting (throwing up) or cannot drink liquids.  You see blood when you vomit.   You cannot have a bowel movement or you cannot pass gas.  Your stomach gets bloated or bigger.  Your skin or the whites of your eyes look yellow.  You faint.  You have bloody, frequent or painful urination (peeing).  You have new symptoms or anything that worries you.    CHILDREN:  Return to the Emergency Department right away if your child has any of the above-listed symptoms or the following:    Pushes  your hand away or screams/cries when his/her belly is touched.  You notice your child is very fussy or weak.  Your child is very tired and is too tired to eat or drink.  Your child is dehydrated.  Signs of dehydration can be:  Significant change in the amount of wet diapers/urine.  Your infant or child starts to have dry mouth and lips, or no saliva (spit) or tears.    PREGNANT WOMEN:  Return to the Emergency Department right away if you have any of the above-listed symptoms or the following:    You have bleeding, leaking fluid or passing tissue from the vagina.  You have worse pain or cramping, or pain in your shoulder or back.  You have vomiting that will not stop.  You have a temperature of 100oF or more.  Your baby is not moving as much as usual.  You faint.  You get a bad headache with or without eye problems and abdominal pain.  You have a seizure.  You have unusual discharge from your vagina and abdominal pain.    Abdominal pain is pretty common during pregnancy.  Your pain may or may not be related to your pregnancy. You should follow-up closely with your OB provider so they can evaluate you and your baby.  Until you follow-up with your regular provider, do the following:     Avoid sex and do not put anything in your vagina.  Drink clear fluids.  Only take medications approved by your provider.    MORE INFORMATION:    Appendicitis:  A possible cause of abdominal pain in any person who still has their appendix is acute appendicitis. Appendicitis is often hard to diagnose.  Testing does not always rule out early appendicitis or other causes of abdominal pain. Close follow-up with your provider and re-evaluations may be needed to figure out the reason for your abdominal pain.    Follow-up:  It is very important that you make an appointment with your clinic and go to the appointment.  If you do not follow-up with your primary provider, it may result in missing an important development which could result in  "permanent injury or disability and/or lasting pain.  If there is any problem keeping your appointment, call your provider or return to the Emergency Department.    Medications:  Take your medications as directed by your provider today.  Before using over-the-counter medications, ask your provider and make sure to take the medications as directed.  If you have any questions about medications, ask your provider.    Diet:  Resume your normal diet as much as possible, but do not eat fried, fatty or spicy foods while you have pain.  Do not drink alcohol or have caffeine.  Do not smoke tobacco.    Probiotics: If you have been given an antibiotic, you may want to also take a probiotic pill or eat yogurt with live cultures. Probiotics have \"good bacteria\" to help your intestines stay healthy. Studies have shown that probiotics help prevent diarrhea (loose stools) and other intestine problems (including C. diff infection) when you take antibiotics. You can buy these without a prescription in the pharmacy section of the store.     If you were given a prescription for medicine here today, be sure to read all of the information (including the package insert) that comes with your prescription.  This will include important information about the medicine, its side effects, and any warnings that you need to know about.  The pharmacist who fills the prescription can provide more information and answer questions you may have about the medicine.  If you have questions or concerns that the pharmacist cannot address, please call or return to the Emergency Department.       Remember that you can always come back to the Emergency Department if you are not able to see your regular provider in the amount of time listed above, if you get any new symptoms, or if there is anything that worries you.    "

## 2025-01-02 NOTE — ED TRIAGE NOTES
Triage Assessment (Adult)       Row Name 01/02/25 1023          Triage Assessment    Airway WDL WDL        Respiratory WDL    Respiratory WDL WDL

## 2025-01-03 ENCOUNTER — LAB (OUTPATIENT)
Dept: LAB | Facility: CLINIC | Age: 45
End: 2025-01-03
Payer: COMMERCIAL

## 2025-01-03 DIAGNOSIS — R10.32 LEFT LOWER QUADRANT ABDOMINAL PAIN: ICD-10-CM

## 2025-01-03 DIAGNOSIS — K52.9 PROCTOCOLITIS: ICD-10-CM

## 2025-01-03 LAB

## 2025-01-03 PROCEDURE — 87493 C DIFF AMPLIFIED PROBE: CPT

## 2025-01-03 PROCEDURE — 87507 IADNA-DNA/RNA PROBE TQ 12-25: CPT

## 2025-04-21 ENCOUNTER — OFFICE VISIT (OUTPATIENT)
Dept: FAMILY MEDICINE | Facility: CLINIC | Age: 45
End: 2025-04-21
Payer: COMMERCIAL

## 2025-04-21 VITALS
SYSTOLIC BLOOD PRESSURE: 112 MMHG | HEART RATE: 58 BPM | WEIGHT: 140.3 LBS | TEMPERATURE: 98.5 F | DIASTOLIC BLOOD PRESSURE: 70 MMHG | RESPIRATION RATE: 10 BRPM | BODY MASS INDEX: 22.55 KG/M2 | OXYGEN SATURATION: 99 % | HEIGHT: 66 IN

## 2025-04-21 DIAGNOSIS — Z23 NEED FOR COVID-19 VACCINE: ICD-10-CM

## 2025-04-21 DIAGNOSIS — Z23 NEED FOR HEPATITIS B VACCINATION: ICD-10-CM

## 2025-04-21 DIAGNOSIS — Z23 NEED FOR INFLUENZA VACCINATION: ICD-10-CM

## 2025-04-21 DIAGNOSIS — E78.5 HYPERLIPIDEMIA LDL GOAL <100: ICD-10-CM

## 2025-04-21 DIAGNOSIS — Z00.00 ROUTINE GENERAL MEDICAL EXAMINATION AT A HEALTH CARE FACILITY: Primary | ICD-10-CM

## 2025-04-21 PROCEDURE — 99213 OFFICE O/P EST LOW 20 MIN: CPT | Mod: 25

## 2025-04-21 PROCEDURE — 99396 PREV VISIT EST AGE 40-64: CPT | Mod: 25

## 2025-04-21 PROCEDURE — 90471 IMMUNIZATION ADMIN: CPT

## 2025-04-21 PROCEDURE — 90472 IMMUNIZATION ADMIN EACH ADD: CPT

## 2025-04-21 PROCEDURE — 90746 HEPB VACCINE 3 DOSE ADULT IM: CPT

## 2025-04-21 PROCEDURE — 90480 ADMN SARSCOV2 VAC 1/ONLY CMP: CPT

## 2025-04-21 PROCEDURE — 3074F SYST BP LT 130 MM HG: CPT

## 2025-04-21 PROCEDURE — 90656 IIV3 VACC NO PRSV 0.5 ML IM: CPT

## 2025-04-21 PROCEDURE — 3078F DIAST BP <80 MM HG: CPT

## 2025-04-21 PROCEDURE — 91320 SARSCV2 VAC 30MCG TRS-SUC IM: CPT

## 2025-04-21 SDOH — HEALTH STABILITY: PHYSICAL HEALTH: ON AVERAGE, HOW MANY DAYS PER WEEK DO YOU ENGAGE IN MODERATE TO STRENUOUS EXERCISE (LIKE A BRISK WALK)?: 5 DAYS

## 2025-04-21 ASSESSMENT — SOCIAL DETERMINANTS OF HEALTH (SDOH): HOW OFTEN DO YOU GET TOGETHER WITH FRIENDS OR RELATIVES?: TWICE A WEEK

## 2025-04-21 NOTE — PROGRESS NOTES
Preventive Care Visit  [facility]  SHERIE Spain CNP, Nurse Practitioner Primary Care  Apr 21, 2025      Assessment & Plan     Routine general medical examination at a health care facility  Health maintenance updated     Hyperlipidemia LDL goal <100  Will update labs. If LDL still very high would recommend restarting rosuvastatin  - Lipid panel reflex to direct LDL Fasting; Future  - Comprehensive metabolic panel (BMP + Alb, Alk Phos, ALT, AST, Total. Bili, TP); Future    Need for hepatitis B vaccination  - HEPATITIS B, ADULT 20+ (ENGERIX-B/RECOMBIVAX HB)    Need for influenza vaccination  - INFLUENZA VACCINE,SPLIT VIRUS,TRIVALENT,PF(FLUZONE)    Need for COVID-19 vaccine  - COVID-19 12+ (PFIZER)          Counseling  Appropriate preventive services were addressed with this patient via screening, questionnaire, or discussion as appropriate for fall prevention, nutrition, physical activity, Tobacco-use cessation, social engagement, weight loss and cognition.  Checklist reviewing preventive services available has been given to the patient.  Reviewed patient's diet, addressing concerns and/or questions.   He is at risk for psychosocial distress and has been provided with information to reduce risk.           Nhung Bailey is a 44 year old, presenting for the following:  Physical, Follow Up (From last ER visit Abdominal pain), and Medication Question (rosuvastatin (CRESTOR) 20 MG tablet wants to know if he should still be taking)        4/21/2025     1:09 PM   Additional Questions   Roomed by katie espinoza          HPI  Was on 20mg rosuvastatin for 3 months then stopped because he wasn't sure if he was supposed to stay off of it and then his PCP moved    Marathon distance runner for 15 years  Eats really healthy - lots of fruits and vegetables. Just snacks a lot        The 10-year ASCVD risk score (Marce SANTIAGO, et al., 2019) is: 3%    Values used to calculate the score:      Age: 44 years       Sex: Male      Is Non- : No      Diabetic: No      Tobacco smoker: No      Systolic Blood Pressure: 112 mmHg      Is BP treated: No      HDL Cholesterol: 52 mg/dL      Total Cholesterol: 280 mg/dL       Advance Care Planning  Discussed advance care planning with patient; however, patient declined at this time.        4/21/2025   General Health   How would you rate your overall physical health? Good   Feel stress (tense, anxious, or unable to sleep) To some extent   (!) STRESS CONCERN      4/21/2025   Nutrition   Three or more servings of calcium each day? Yes   Diet: Gluten-free/reduced   How many servings of fruit and vegetables per day? (!) 2-3   How many sweetened beverages each day? 0-1         4/21/2025   Exercise   Days per week of moderate/strenous exercise 5 days         4/21/2025   Social Factors   Frequency of gathering with friends or relatives Twice a week   Worry food won't last until get money to buy more No   Food not last or not have enough money for food? No   Do you have housing? (Housing is defined as stable permanent housing and does not include staying ouside in a car, in a tent, in an abandoned building, in an overnight shelter, or couch-surfing.) Yes   Are you worried about losing your housing? No   Lack of transportation? No   Unable to get utilities (heat,electricity)? No         4/21/2025   Dental   Dentist two times every year? Yes     Today's PHQ-2 Score:       4/21/2025     1:00 PM   PHQ-2 ( 1999 Pfizer)   Q1: Little interest or pleasure in doing things 1   Q2: Feeling down, depressed or hopeless 1   PHQ-2 Score 2    Q1: Little interest or pleasure in doing things Several days   Q2: Feeling down, depressed or hopeless Several days   PHQ-2 Score 2       Patient-reported           4/21/2025   Substance Use   Alcohol more than 3/day or more than 7/wk Not Applicable   Do you use any other substances recreationally? (!) CANNABIS PRODUCTS     Social History     Tobacco  "Use    Smoking status: Former     Current packs/day: 0.00     Average packs/day: 1 pack/day for 10.0 years (10.0 ttl pk-yrs)     Types: Cigarettes     Start date: 1997     Quit date: 2007     Years since quittin.9    Smokeless tobacco: Never   Vaping Use    Vaping status: Former   Substance Use Topics    Alcohol use: Not Currently    Drug use: Yes     Frequency: 10.0 times per week     Types: Marijuana     Comment: edibles         2025   STI Screening   New sexual partner(s) since last STI/HIV test? No   ASCVD Risk   The 10-year ASCVD risk score (Marce SANTIAGO, et al., 2019) is: 3%    Values used to calculate the score:      Age: 44 years      Sex: Male      Is Non- : No      Diabetic: No      Tobacco smoker: No      Systolic Blood Pressure: 112 mmHg      Is BP treated: No      HDL Cholesterol: 52 mg/dL      Total Cholesterol: 280 mg/dL        2025   Contraception/Family Planning   Questions about contraception or family planning (!) YES      Reviewed and updated as needed this visit by Provider   Tobacco   Meds   Med Hx  Surg Hx  Fam Hx  Soc Hx Sexual Activity          History reviewed. No pertinent past medical history.         Objective    Exam  /70 (BP Location: Right arm, Patient Position: Sitting, Cuff Size: Adult Regular)   Pulse 58   Temp 98.5  F (36.9  C) (Oral)   Resp 10   Ht 1.676 m (5' 6\")   Wt 63.6 kg (140 lb 4.8 oz)   SpO2 99%   BMI 22.65 kg/m     Estimated body mass index is 22.65 kg/m  as calculated from the following:    Height as of this encounter: 1.676 m (5' 6\").    Weight as of this encounter: 63.6 kg (140 lb 4.8 oz).    Physical Exam  GENERAL: alert and no distress  EYES: Eyes grossly normal to inspection, and conjunctivae and sclerae normal  HENT: ear canals and TM's normal, and mouth without ulcers or lesions  RESP: lungs clear to auscultation - no rales, rhonchi or wheezes  CV: regular rate and rhythm, normal S1 S2, no S3 " or S4, no murmur, click or rub, no peripheral edema  MS: no gross musculoskeletal defects noted, no edema  PSYCH: mentation appears normal, affect normal      Signed Electronically by: SHERIE Spain CNP

## 2025-04-21 NOTE — PATIENT INSTRUCTIONS
Patient Education   Preventive Care Advice   This is general advice given by our system to help you stay healthy. However, your care team may have specific advice just for you. Please talk to your care team about your preventive care needs.  Nutrition  Eat 5 or more servings of fruits and vegetables each day.  Try wheat bread, brown rice and whole grain pasta (instead of white bread, rice, and pasta).  Get enough calcium and vitamin D. Check the label on foods and aim for 100% of the RDA (recommended daily allowance).  Lifestyle  Exercise at least 150 minutes each week  (30 minutes a day, 5 days a week).  Do muscle strengthening activities 2 days a week. These help control your weight and prevent disease.  No smoking.  Wear sunscreen to prevent skin cancer.  Have a dental exam and cleaning every 6 months.  Yearly exams  See your health care team every year to talk about:  Any changes in your health.  Any medicines your care team has prescribed.  Preventive care, family planning, and ways to prevent chronic diseases.  Shots (vaccines)   HPV shots (up to age 26), if you've never had them before.  Hepatitis B shots (up to age 59), if you've never had them before.  COVID-19 shot: Get this shot when it's due.  Flu shot: Get a flu shot every year.  Tetanus shot: Get a tetanus shot every 10 years.  Pneumococcal, hepatitis A, and RSV shots: Ask your care team if you need these based on your risk.  Shingles shot (for age 50 and up)  General health tests  Diabetes screening:  Starting at age 35, Get screened for diabetes at least every 3 years.  If you are younger than age 35, ask your care team if you should be screened for diabetes.  Cholesterol test: At age 39, start having a cholesterol test every 5 years, or more often if advised.  Bone density scan (DEXA): At age 50, ask your care team if you should have this scan for osteoporosis (brittle bones).  Hepatitis C: Get tested at least once in your life.  STIs (sexually  transmitted infections)  Before age 24: Ask your care team if you should be screened for STIs.  After age 24: Get screened for STIs if you're at risk. You are at risk for STIs (including HIV) if:  You are sexually active with more than one person.  You don't use condoms every time.  You or a partner was diagnosed with a sexually transmitted infection.  If you are at risk for HIV, ask about PrEP medicine to prevent HIV.  Get tested for HIV at least once in your life, whether you are at risk for HIV or not.  Cancer screening tests  Cervical cancer screening: If you have a cervix, begin getting regular cervical cancer screening tests starting at age 21.  Breast cancer scan (mammogram): If you've ever had breasts, begin having regular mammograms starting at age 40. This is a scan to check for breast cancer.  Colon cancer screening: It is important to start screening for colon cancer at age 45.  Have a colonoscopy test every 10 years (or more often if you're at risk) Or, ask your provider about stool tests like a FIT test every year or Cologuard test every 3 years.  To learn more about your testing options, visit:   .  For help making a decision, visit:   https://bit.ly/xc89743.  Prostate cancer screening test: If you have a prostate, ask your care team if a prostate cancer screening test (PSA) at age 55 is right for you.  Lung cancer screening: If you are a current or former smoker ages 50 to 80, ask your care team if ongoing lung cancer screenings are right for you.  For informational purposes only. Not to replace the advice of your health care provider. Copyright   2023 [facility]. All rights reserved. Clinically reviewed by the [facility] Transitions Program. "[employer] 789239 - REV 01/24.  Learning About Stress  What is stress?     Stress is your body's response to a hard situation. Your body can have a physical, emotional, or mental response. Stress is a fact of life for most people, and it  affects everyone differently. What causes stress for you may not be stressful for someone else.  A lot of things can cause stress. You may feel stress when you go on a job interview, take a test, or run a race. This kind of short-term stress is normal and even useful. It can help you if you need to work hard or react quickly. For example, stress can help you finish an important job on time.  Long-term stress is caused by ongoing stressful situations or events. Examples of long-term stress include long-term health problems, ongoing problems at work, or conflicts in your family. Long-term stress can harm your health.  How does stress affect your health?  When you are stressed, your body responds as though you are in danger. It makes hormones that speed up your heart, make you breathe faster, and give you a burst of energy. This is called the fight-or-flight stress response. If the stress is over quickly, your body goes back to normal and no harm is done.  But if stress happens too often or lasts too long, it can have bad effects. Long-term stress can make you more likely to get sick, and it can make symptoms of some diseases worse. If you tense up when you are stressed, you may develop neck, shoulder, or low back pain. Stress is linked to high blood pressure and heart disease.  Stress also harms your emotional health. It can make you nur, tense, or depressed. Your relationships may suffer, and you may not do well at work or school.  What can you do to manage stress?  You can try these things to help manage stress:   Do something active. Exercise or activity can help reduce stress. Walking is a great way to get started. Even everyday activities such as housecleaning or yard work can help.  Try yoga or michelle chi. These techniques combine exercise and meditation. You may need some training at first to learn them.  Do something you enjoy. For example, listen to music or go to a movie. Practice your hobby or do volunteer  "work.  Meditate. This can help you relax, because you are not worrying about what happened before or what may happen in the future.  Do guided imagery. Imagine yourself in any setting that helps you feel calm. You can use online videos, books, or a teacher to guide you.  Do breathing exercises. For example:  From a standing position, bend forward from the waist with your knees slightly bent. Let your arms dangle close to the floor.  Breathe in slowly and deeply as you return to a standing position. Roll up slowly and lift your head last.  Hold your breath for just a few seconds in the standing position.  Breathe out slowly and bend forward from the waist.  Let your feelings out. Talk, laugh, cry, and express anger when you need to. Talking with supportive friends or family, a counselor, or a hernan leader about your feelings is a healthy way to relieve stress. Avoid discussing your feelings with people who make you feel worse.  Write. It may help to write about things that are bothering you. This helps you find out how much stress you feel and what is causing it. When you know this, you can find better ways to cope.  What can you do to prevent stress?  You might try some of these things to help prevent stress:  Manage your time. This helps you find time to do the things you want and need to do.  Get enough sleep. Your body recovers from the stresses of the day while you are sleeping.  Get support. Your family, friends, and community can make a difference in how you experience stress.  Limit your news feed. Avoid or limit time on social media or news that may make you feel stressed.  Do something active. Exercise or activity can help reduce stress. Walking is a great way to get started.  Where can you learn more?  Go to https://www.[employer].net/patiented  Enter N032 in the search box to learn more about \"Learning About Stress.\"  Current as of: October 24, 2024  Content Version: 14.4 2024-2025 [employer].   Care instructions adapted under license by your healthcare professional. If you have questions about a medical condition or this instruction, always ask your healthcare professional. The [employer] disclaims any warranty or liability for your use of this information.

## 2025-04-22 ENCOUNTER — LAB (OUTPATIENT)
Dept: LAB | Facility: CLINIC | Age: 45
End: 2025-04-22
Payer: COMMERCIAL

## 2025-04-22 DIAGNOSIS — E78.5 HYPERLIPIDEMIA LDL GOAL <100: ICD-10-CM

## 2025-04-22 DIAGNOSIS — R10.32 LEFT LOWER QUADRANT ABDOMINAL PAIN: Primary | ICD-10-CM

## 2025-04-22 LAB
ALBUMIN SERPL BCG-MCNC: 4.7 G/DL (ref 3.5–5.2)
ALP SERPL-CCNC: 67 U/L (ref 40–150)
ALT SERPL W P-5'-P-CCNC: 21 U/L (ref 0–70)
ANION GAP SERPL CALCULATED.3IONS-SCNC: 12 MMOL/L (ref 7–15)
AST SERPL W P-5'-P-CCNC: 32 U/L (ref 0–45)
BILIRUB SERPL-MCNC: 0.8 MG/DL
BUN SERPL-MCNC: 16.6 MG/DL (ref 6–20)
CALCIUM SERPL-MCNC: 9.8 MG/DL (ref 8.8–10.4)
CHLORIDE SERPL-SCNC: 103 MMOL/L (ref 98–107)
CHOLEST SERPL-MCNC: 275 MG/DL
CREAT SERPL-MCNC: 1.08 MG/DL (ref 0.67–1.17)
EGFRCR SERPLBLD CKD-EPI 2021: 86 ML/MIN/1.73M2
FASTING STATUS PATIENT QL REPORTED: YES
FASTING STATUS PATIENT QL REPORTED: YES
GLUCOSE SERPL-MCNC: 103 MG/DL (ref 70–99)
HCO3 SERPL-SCNC: 24 MMOL/L (ref 22–29)
HDLC SERPL-MCNC: 52 MG/DL
LDLC SERPL CALC-MCNC: 201 MG/DL
NONHDLC SERPL-MCNC: 223 MG/DL
POTASSIUM SERPL-SCNC: 4.5 MMOL/L (ref 3.4–5.3)
PROT SERPL-MCNC: 7.4 G/DL (ref 6.4–8.3)
SODIUM SERPL-SCNC: 139 MMOL/L (ref 135–145)
TRIGL SERPL-MCNC: 109 MG/DL

## 2025-04-22 PROCEDURE — 80053 COMPREHEN METABOLIC PANEL: CPT

## 2025-04-22 PROCEDURE — 36415 COLL VENOUS BLD VENIPUNCTURE: CPT

## 2025-04-22 PROCEDURE — 80061 LIPID PANEL: CPT

## 2025-04-23 DIAGNOSIS — E78.5 HYPERLIPIDEMIA LDL GOAL <100: Primary | ICD-10-CM

## 2025-04-23 RX ORDER — ROSUVASTATIN CALCIUM 10 MG/1
10 TABLET, COATED ORAL DAILY
Qty: 90 TABLET | Refills: 0 | Status: SHIPPED | OUTPATIENT
Start: 2025-04-23

## 2025-04-24 ENCOUNTER — MYC MEDICAL ADVICE (OUTPATIENT)
Dept: FAMILY MEDICINE | Facility: CLINIC | Age: 45
End: 2025-04-24
Payer: COMMERCIAL

## 2025-04-30 ENCOUNTER — PATIENT OUTREACH (OUTPATIENT)
Dept: CARE COORDINATION | Facility: CLINIC | Age: 45
End: 2025-04-30
Payer: COMMERCIAL

## 2025-07-03 ENCOUNTER — OFFICE VISIT (OUTPATIENT)
Dept: CARDIOLOGY | Facility: CLINIC | Age: 45
End: 2025-07-03
Payer: COMMERCIAL

## 2025-07-03 VITALS
WEIGHT: 130.8 LBS | BODY MASS INDEX: 21.79 KG/M2 | HEART RATE: 56 BPM | SYSTOLIC BLOOD PRESSURE: 122 MMHG | HEIGHT: 65 IN | DIASTOLIC BLOOD PRESSURE: 74 MMHG

## 2025-07-03 DIAGNOSIS — E78.00 PURE HYPERCHOLESTEROLEMIA: Primary | ICD-10-CM

## 2025-07-03 DIAGNOSIS — E78.5 HYPERLIPIDEMIA LDL GOAL <100: ICD-10-CM

## 2025-07-03 DIAGNOSIS — Z83.438 FAMILY HISTORY OF HYPERLIPIDEMIA: ICD-10-CM

## 2025-07-03 NOTE — PROGRESS NOTES
"    SERVICE DATE: July 3, 2025          Keshawn Ying MD  Cardiology      REFERRING PROVIDER:  SHERIE Spain CNP  [address] [place] [address]    REASON FOR VISIT:      HISTORY OF PRESENT ILLNESS:  Leo Gabriel is a 45 year old male, ***        Physical exam: /74 (BP Location: Right arm, Patient Position: Sitting, Cuff Size: Adult Regular)   Pulse 56   Ht 1.638 m (5' 4.5\")   Wt 59.3 kg (130 lb 12.8 oz)   BMI 22.11 kg/m      New patient.  *** minutes spent by me on the date of the encounter doing chart review, history and exam, documentation and further activities per the note.    The longitudinal plan of care for the condition(s) below were addressed during this visit. Due to the added complexity in care, I will continue to support Leo in the subsequent management of this condition(s) and with the ongoing continuity of care of this condition(s).      Orders Placed This Encounter   Procedures    Comprehensive metabolic panel    Lipid Profile    TSH with free T4 reflex    Lipoprotein (a)    APOLIPOPROTEIN B    CRP cardiac risk    Follow-Up with Cardiology    EKG 12-lead complete w/read - Clinics (performed today)         CURRENT MEDICATIONS:  Current Outpatient Medications   Medication Sig Dispense Refill    rosuvastatin (CRESTOR) 10 MG tablet Take 1 tablet (10 mg) by mouth daily. 90 tablet 0       ALLERGIES:  Allergies   Allergen Reactions    Amoxicillin Difficulty breathing and Rash       This note was completed in part using dictation via the Dragon voice recognition software. Some word and grammatical errors may occur and must be interpreted in the appropriate clinical context. If there are any questions pertaining to this issue, please contact me for further clarification.       "

## 2025-07-03 NOTE — LETTER
7/3/2025    SHERIE Spain CNP  [id number] [facility] [id number]    RE: Leo Gabriel       Dear Colleague,     I had the pleasure of seeing Leo Gabriel in the [facility].      SERVICE DATE: July 3, 2025      DIAGNOSES/ASSESSMENT:    1.  Pure hypercholesterolemia with LDL of 201, normal HDL and triglycerides.  2.  Family history of hyperlipidemia.  3.  No family history of premature coronary artery disease.  4.  Normal BMI of 22.  5.  Never tobacco user.  6.  Marathon runner.    PLAN:    - Continue rosuvastatin 10 mg daily.  - Check fasting lipid profile in one month.  - Educated on dietary modifications focusing on reducing processed foods and adopting a Mediterranean diet.  - Schedule a video visit follow-up in one month.  - Ordered labs including apolipoprotein B, lipoprotein A, fasting lipids, CMP, TSH, and high sensitivity CRP prior to the next visit.  - Advised the patient to have his children screened for lipid levels.    Keshawn Ying MD  Cardiology      REFERRING PROVIDER:  SHERIE Spain CNP  [id number] [id number]    REASON FOR VISIT:  The patient is here for lipid management, referred by the primary care team.    HISTORY OF PRESENT ILLNESS:  Leo Gabriel is a 45 year old male, new to cardiology and here for management of hypercholesterolemia with LDL of 201 despite being a marathon runner.  He was appropriately started on rosuvastatin 10 mg daily by his primary care team in April 2025 and has been tolerating this well.  No muscle related symptoms.  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  Works in [employer].  Normotensive, not diabetic.  Quit all alcohol use in 2020.  , 2 adult children (twins) aged 18 years.    Lab results from today show total cholesterol at 275 mg/dL, HDL at 52 mg/dL, LDL at 201  "mg/dL, triglycerides at 101 mg/dL, and normal creatinine, sodium, potassium, liver enzymes, and CBC.  He does report that his diet was probably high in junk and indeed his LDL was lower at 154 in 2022 and he is motivated to modify his diet with healthier options.    The EKG indicates sinus rhythm at 57 BPM with an early repolarization variant, a normal QTc of 384 ms, and a normal MA interval.     Social History: The patient is a non-smoker, non-alcohol consumer since 2020, and uses marijuana. 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    Family History: Positive for high cholesterol on the mother's side. Uncertain family history of heart disease, with a possible heart attack in the grandfather's fifties. The father has had high blood pressure throughout life.      Physical exam: /74 (BP Location: Right arm, Patient Position: Sitting, Cuff Size: Adult Regular)   Pulse 56   Ht 1.638 m (5' 4.5\")   Wt 59.3 kg (130 lb 12.8 oz)   BMI 22.11 kg/m   No carotid bruit, regular heart sounds, and no murmur.    New patient.  45 minutes spent by me on the date of the encounter doing chart review, history and exam, documentation and further activities per the note.    The longitudinal plan of care for the condition(s) below were addressed during this visit. Due to the added complexity in care, I will continue to support Leo in the subsequent management of this condition(s) and with the ongoing continuity of care of this condition(s).      Orders Placed This Encounter   Procedures     Comprehensive metabolic panel     Lipid Profile     TSH with free T4 reflex     Lipoprotein (a)     APOLIPOPROTEIN B     CRP cardiac risk     Follow-Up with Cardiology     EKG 12-lead complete w/read - Clinics (performed today)         CURRENT MEDICATIONS:  Current Outpatient Medications   Medication Sig Dispense Refill     rosuvastatin (CRESTOR) 10 MG tablet Take 1 tablet (10 mg) by mouth daily. 90 tablet 0 "       ALLERGIES:  Allergies   Allergen Reactions     Amoxicillin Difficulty breathing and Rash       This note was completed in part using dictation via the Dragon voice recognition software. Some word and grammatical errors may occur and must be interpreted in the appropriate clinical context. If there are any questions pertaining to this issue, please contact me for further clarification.         Thank you for allowing me to participate in the care of your patient.      Sincerely,     Keshawn Ying MD     [facility]  cc:   SHERIE Spain [facility]  [address] [place] [address]

## 2025-07-03 NOTE — PROGRESS NOTES
SERVICE DATE: July 3, 2025      DIAGNOSES/ASSESSMENT:    1.  Pure hypercholesterolemia with LDL of 201, normal HDL and triglycerides.  2.  Family history of hyperlipidemia.  3.  No family history of premature coronary artery disease.  4.  Normal BMI of 22.  5.  Never tobacco user.  6.  Marathon runner.    PLAN:    - Continue rosuvastatin 10 mg daily.  - Check fasting lipid profile in one month.  - Educated on dietary modifications focusing on reducing processed foods and adopting a Mediterranean diet.  - Schedule a video visit follow-up in one month.  - Ordered labs including apolipoprotein B, lipoprotein A, fasting lipids, CMP, TSH, and high sensitivity CRP prior to the next visit.  - Advised the patient to have his children screened for lipid levels.    Keshawn Ying MD  Cardiology      REFERRING PROVIDER:  SHERIE Spain [facility]  [address] [place] [address]    REASON FOR VISIT:  The patient is here for lipid management, referred by the primary care team.    HISTORY OF PRESENT ILLNESS:  Leo Gabriel is a 45 year old male, new to cardiology and here for management of hypercholesterolemia with LDL of 201 despite being a marathon runner.  He was appropriately started on rosuvastatin 10 mg daily by his primary care team in April 2025 and has been tolerating this well.  No muscle related symptoms.  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  Works in [employer].  Normotensive, not diabetic.  Quit all alcohol use in 2020.  , 2 adult children (twins) aged 18 years.    Lab results from today show total cholesterol at 275 mg/dL, HDL at 52 mg/dL, LDL at 201 mg/dL, triglycerides at 101 mg/dL, and normal creatinine, sodium, potassium, liver enzymes, and CBC.  He does report that his diet was probably high in junk and indeed his LDL was lower at 154 in 2022 and  "he is motivated to modify his diet with healthier options.    The EKG indicates sinus rhythm at 57 BPM with an early repolarization variant, a normal QTc of 384 ms, and a normal KY interval.     Social History: The patient is a non-smoker, non-alcohol consumer since 2020, and uses marijuana. 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    Family History: Positive for high cholesterol on the mother's side. Uncertain family history of heart disease, with a possible heart attack in the grandfather's fifties. The father has had high blood pressure throughout life.      Physical exam: /74 (BP Location: Right arm, Patient Position: Sitting, Cuff Size: Adult Regular)   Pulse 56   Ht 1.638 m (5' 4.5\")   Wt 59.3 kg (130 lb 12.8 oz)   BMI 22.11 kg/m   No carotid bruit, regular heart sounds, and no murmur.    New patient.  45 minutes spent by me on the date of the encounter doing chart review, history and exam, documentation and further activities per the note.    The longitudinal plan of care for the condition(s) below were addressed during this visit. Due to the added complexity in care, I will continue to support Leo in the subsequent management of this condition(s) and with the ongoing continuity of care of this condition(s).      Orders Placed This Encounter   Procedures    Comprehensive metabolic panel    Lipid Profile    TSH with free T4 reflex    Lipoprotein (a)    APOLIPOPROTEIN B    CRP cardiac risk    Follow-Up with Cardiology    EKG 12-lead complete w/read - Clinics (performed today)         CURRENT MEDICATIONS:  Current Outpatient Medications   Medication Sig Dispense Refill    rosuvastatin (CRESTOR) 10 MG tablet Take 1 tablet (10 mg) by mouth daily. 90 tablet 0       ALLERGIES:  Allergies   Allergen Reactions    Amoxicillin Difficulty breathing and Rash       This note was completed in part using dictation via the Dragon voice recognition software. Some word and grammatical " errors may occur and must be interpreted in the appropriate clinical context. If there are any questions pertaining to this issue, please contact me for further clarification.

## 2025-07-04 ENCOUNTER — APPOINTMENT (OUTPATIENT)
Dept: CT IMAGING | Facility: CLINIC | Age: 45
End: 2025-07-04
Attending: EMERGENCY MEDICINE
Payer: COMMERCIAL

## 2025-07-04 ENCOUNTER — HOSPITAL ENCOUNTER (EMERGENCY)
Facility: CLINIC | Age: 45
Discharge: HOME | End: 2025-07-04
Attending: EMERGENCY MEDICINE | Admitting: EMERGENCY MEDICINE
Payer: COMMERCIAL

## 2025-07-04 ENCOUNTER — APPOINTMENT (OUTPATIENT)
Dept: GENERAL RADIOLOGY | Facility: CLINIC | Age: 45
End: 2025-07-04
Attending: EMERGENCY MEDICINE
Payer: COMMERCIAL

## 2025-07-04 VITALS
RESPIRATION RATE: 18 BRPM | BODY MASS INDEX: 22.24 KG/M2 | HEIGHT: 64 IN | OXYGEN SATURATION: 99 % | WEIGHT: 130.29 LBS | DIASTOLIC BLOOD PRESSURE: 87 MMHG | SYSTOLIC BLOOD PRESSURE: 129 MMHG | HEART RATE: 64 BPM | TEMPERATURE: 98.6 F

## 2025-07-04 DIAGNOSIS — K52.9 ENTERITIS: ICD-10-CM

## 2025-07-04 DIAGNOSIS — R06.02 SHORTNESS OF BREATH: ICD-10-CM

## 2025-07-04 LAB
ALBUMIN SERPL BCG-MCNC: 5.3 G/DL (ref 3.5–5.2)
ALBUMIN UR-MCNC: NEGATIVE MG/DL
ALP SERPL-CCNC: 66 U/L (ref 40–150)
ALT SERPL W P-5'-P-CCNC: 20 U/L (ref 0–70)
ANION GAP SERPL CALCULATED.3IONS-SCNC: 14 MMOL/L (ref 7–15)
APPEARANCE UR: CLEAR
AST SERPL W P-5'-P-CCNC: 30 U/L (ref 0–45)
BASOPHILS # BLD AUTO: 0.1 10E3/UL (ref 0–0.2)
BASOPHILS NFR BLD AUTO: 1 %
BILIRUB SERPL-MCNC: 0.9 MG/DL
BILIRUB UR QL STRIP: NEGATIVE
BUN SERPL-MCNC: 11.9 MG/DL (ref 6–20)
CALCIUM SERPL-MCNC: 10.2 MG/DL (ref 8.8–10.4)
CHLORIDE SERPL-SCNC: 102 MMOL/L (ref 98–107)
COLOR UR AUTO: YELLOW
CREAT SERPL-MCNC: 1.08 MG/DL (ref 0.67–1.17)
D DIMER PPP FEU-MCNC: <0.27 UG/ML FEU (ref 0–0.5)
EGFRCR SERPLBLD CKD-EPI 2021: 85 ML/MIN/1.73M2
EOSINOPHIL # BLD AUTO: 0 10E3/UL (ref 0–0.7)
EOSINOPHIL NFR BLD AUTO: 1 %
ERYTHROCYTE [DISTWIDTH] IN BLOOD BY AUTOMATED COUNT: 11.9 % (ref 10–15)
GLUCOSE SERPL-MCNC: 106 MG/DL (ref 70–99)
GLUCOSE UR STRIP-MCNC: NEGATIVE MG/DL
HCO3 SERPL-SCNC: 24 MMOL/L (ref 22–29)
HCT VFR BLD AUTO: 49.9 % (ref 40–53)
HGB BLD-MCNC: 17.3 G/DL (ref 13.3–17.7)
HGB UR QL STRIP: NEGATIVE
HOLD SPECIMEN: NORMAL
HOLD SPECIMEN: NORMAL
IMM GRANULOCYTES # BLD: 0 10E3/UL
IMM GRANULOCYTES NFR BLD: 0 %
KETONES UR STRIP-MCNC: 40 MG/DL
LACTATE SERPL-SCNC: 1.9 MMOL/L (ref 0.7–2)
LEUKOCYTE ESTERASE UR QL STRIP: NEGATIVE
LYMPHOCYTES # BLD AUTO: 1.5 10E3/UL (ref 0.8–5.3)
LYMPHOCYTES NFR BLD AUTO: 32 %
MCH RBC QN AUTO: 30.4 PG (ref 26.5–33)
MCHC RBC AUTO-ENTMCNC: 34.7 G/DL (ref 31.5–36.5)
MCV RBC AUTO: 88 FL (ref 78–100)
MONOCYTES # BLD AUTO: 0.5 10E3/UL (ref 0–1.3)
MONOCYTES NFR BLD AUTO: 10 %
MUCOUS THREADS #/AREA URNS LPF: PRESENT /LPF
NEUTROPHILS # BLD AUTO: 2.6 10E3/UL (ref 1.6–8.3)
NEUTROPHILS NFR BLD AUTO: 56 %
NITRATE UR QL: NEGATIVE
NRBC # BLD AUTO: 0 10E3/UL
NRBC BLD AUTO-RTO: 0 /100
PH UR STRIP: 6 [PH] (ref 5–7)
PLATELET # BLD AUTO: 231 10E3/UL (ref 150–450)
POTASSIUM SERPL-SCNC: 4.3 MMOL/L (ref 3.4–5.3)
PROT SERPL-MCNC: 8.2 G/DL (ref 6.4–8.3)
RBC # BLD AUTO: 5.69 10E6/UL (ref 4.4–5.9)
RBC URINE: 1 /HPF
SODIUM SERPL-SCNC: 140 MMOL/L (ref 135–145)
SP GR UR STRIP: 1.01 (ref 1–1.03)
SQUAMOUS EPITHELIAL: <1 /HPF
TROPONIN T SERPL HS-MCNC: <6 NG/L
UROBILINOGEN UR STRIP-MCNC: NORMAL MG/DL
WBC # BLD AUTO: 4.7 10E3/UL (ref 4–11)
WBC URINE: <1 /HPF

## 2025-07-04 PROCEDURE — 74177 CT ABD & PELVIS W/CONTRAST: CPT

## 2025-07-04 PROCEDURE — 93005 ELECTROCARDIOGRAM TRACING: CPT

## 2025-07-04 PROCEDURE — 81003 URINALYSIS AUTO W/O SCOPE: CPT | Performed by: EMERGENCY MEDICINE

## 2025-07-04 PROCEDURE — 85379 FIBRIN DEGRADATION QUANT: CPT | Performed by: EMERGENCY MEDICINE

## 2025-07-04 PROCEDURE — 250N000011 HC RX IP 250 OP 636: Performed by: EMERGENCY MEDICINE

## 2025-07-04 PROCEDURE — 258N000003 HC RX IP 258 OP 636: Performed by: EMERGENCY MEDICINE

## 2025-07-04 PROCEDURE — 250N000009 HC RX 250: Performed by: EMERGENCY MEDICINE

## 2025-07-04 PROCEDURE — 80053 COMPREHEN METABOLIC PANEL: CPT | Performed by: EMERGENCY MEDICINE

## 2025-07-04 PROCEDURE — 85025 COMPLETE CBC W/AUTO DIFF WBC: CPT | Performed by: EMERGENCY MEDICINE

## 2025-07-04 PROCEDURE — 99285 EMERGENCY DEPT VISIT HI MDM: CPT | Mod: 25

## 2025-07-04 PROCEDURE — 83605 ASSAY OF LACTIC ACID: CPT | Performed by: EMERGENCY MEDICINE

## 2025-07-04 PROCEDURE — 36415 COLL VENOUS BLD VENIPUNCTURE: CPT | Performed by: EMERGENCY MEDICINE

## 2025-07-04 PROCEDURE — 71046 X-RAY EXAM CHEST 2 VIEWS: CPT

## 2025-07-04 PROCEDURE — 96374 THER/PROPH/DIAG INJ IV PUSH: CPT | Mod: 59

## 2025-07-04 PROCEDURE — 96361 HYDRATE IV INFUSION ADD-ON: CPT

## 2025-07-04 PROCEDURE — 84484 ASSAY OF TROPONIN QUANT: CPT | Performed by: EMERGENCY MEDICINE

## 2025-07-04 RX ORDER — IOPAMIDOL 755 MG/ML
500 INJECTION, SOLUTION INTRAVASCULAR ONCE
Status: COMPLETED | OUTPATIENT
Start: 2025-07-04 | End: 2025-07-04

## 2025-07-04 RX ORDER — KETOROLAC TROMETHAMINE 15 MG/ML
15 INJECTION, SOLUTION INTRAMUSCULAR; INTRAVENOUS ONCE
Status: COMPLETED | OUTPATIENT
Start: 2025-07-04 | End: 2025-07-04

## 2025-07-04 RX ADMIN — KETOROLAC TROMETHAMINE 15 MG: 15 INJECTION, SOLUTION INTRAMUSCULAR; INTRAVENOUS at 10:01

## 2025-07-04 RX ADMIN — IOPAMIDOL 65 ML: 755 INJECTION, SOLUTION INTRAVENOUS at 11:04

## 2025-07-04 RX ADMIN — SODIUM CHLORIDE 100 ML: 9 INJECTION, SOLUTION INTRAVENOUS at 11:04

## 2025-07-04 RX ADMIN — SODIUM CHLORIDE 1000 ML: 9 INJECTION, SOLUTION INTRAVENOUS at 09:54

## 2025-07-04 ASSESSMENT — COLUMBIA-SUICIDE SEVERITY RATING SCALE - C-SSRS
2. HAVE YOU ACTUALLY HAD ANY THOUGHTS OF KILLING YOURSELF IN THE PAST MONTH?: NO
6. HAVE YOU EVER DONE ANYTHING, STARTED TO DO ANYTHING, OR PREPARED TO DO ANYTHING TO END YOUR LIFE?: NO
1. IN THE PAST MONTH, HAVE YOU WISHED YOU WERE DEAD OR WISHED YOU COULD GO TO SLEEP AND NOT WAKE UP?: NO

## 2025-07-04 ASSESSMENT — ACTIVITIES OF DAILY LIVING (ADL)
ADLS_ACUITY_SCORE: 41

## 2025-07-04 NOTE — ED PROVIDER NOTES
"Emergency Department Note      History of Present Illness     Chief Complaint   Flank Pain    HPI   Leo Gabriel is a 45 year old male who presents with left sided chest and abdominal pain. Symptoms wax and wane. No flank pain. Pain is intermittent but more present today. No dysuria. No fever or cough but woke up sweaty last. No vomiting but having watery stool for the past week. Pt having some SOB. No CP. Pt saw cardiology yesterday due to h/o HPL. Pt was here 1/25 with similar sx and had \"e coli\" and this feels similar.     Independent Historian   None    Review of External Notes   1/2/25 CT A/P:    IMPRESSION:   1.  Mild diffuse wall thickening of the sigmoid colon and rectum  compatible with a proctocolitis which may be infectious or  inflammatory in etiology.  2.  Few pulmonary nodules of the lung bases measuring less than 6 mm.  See recommendations below.    Past Medical History     Medical History and Problem List   No past medical history on file.    Medications   rosuvastatin (CRESTOR) 10 MG tablet        Surgical History   Past Surgical History:   Procedure Laterality Date    BRONCHOSCOPY  2020    with cauterization    HERNIA REPAIR         Physical Exam     Patient Vitals for the past 24 hrs:   BP Temp Temp src Pulse Resp SpO2 Height Weight   07/04/25 1247 129/87 -- -- 64 -- 99 % -- --   07/04/25 0941 114/80 98.6  F (37  C) Oral 76 18 99 % 1.626 m (5' 4\") 59.1 kg (130 lb 4.7 oz)     Physical Exam  VS: Reviewed per above  HENT: Mucous membranes moist  EYES: sclera anicteric  CV: Rate as noted, regular rhythm.   RESP: Effort normal. Breath sounds are normal bilaterally.  GI: no tenderness/rebound/guarding, not distended.  NEURO: Alert, moving all extremities  MSK: No deformity of the extremities  SKIN: Warm and dry    Diagnostics     Lab Results   Labs Ordered and Resulted from Time of ED Arrival to Time of ED Departure   ROUTINE UA WITH MICROSCOPIC REFLEX TO CULTURE - Abnormal       Result Value    Color " Urine Yellow      Appearance Urine Clear      Glucose Urine Negative      Bilirubin Urine Negative      Ketones Urine 40 (*)     Specific Gravity Urine 1.015      Blood Urine Negative      pH Urine 6.0      Protein Albumin Urine Negative      Urobilinogen Urine Normal      Nitrite Urine Negative      Leukocyte Esterase Urine Negative      Mucus Urine Present (*)     RBC Urine 1      WBC Urine <1      Squamous Epithelials Urine <1     COMPREHENSIVE METABOLIC PANEL - Abnormal    Sodium 140      Potassium 4.3      Carbon Dioxide (CO2) 24      Anion Gap 14      Urea Nitrogen 11.9      Creatinine 1.08      GFR Estimate 85      Calcium 10.2      Chloride 102      Glucose 106 (*)     Alkaline Phosphatase 66      AST 30      ALT 20      Protein Total 8.2      Albumin 5.3 (*)     Bilirubin Total 0.9     LACTIC ACID WHOLE BLOOD WITH 1X REPEAT IN 2 HR WHEN >2 - Normal    Lactic Acid, Initial 1.9     D DIMER QUANTITATIVE - Normal    D-Dimer Quantitative <0.27     TROPONIN T, HIGH SENSITIVITY - Normal    Troponin T, High Sensitivity <6     CBC WITH PLATELETS AND DIFFERENTIAL    WBC Count 4.7      RBC Count 5.69      Hemoglobin 17.3      Hematocrit 49.9      MCV 88      MCH 30.4      MCHC 34.7      RDW 11.9      Platelet Count 231      % Neutrophils 56      % Lymphocytes 32      % Monocytes 10      % Eosinophils 1      % Basophils 1      % Immature Granulocytes 0      NRBCs per 100 WBC 0      Absolute Neutrophils 2.6      Absolute Lymphocytes 1.5      Absolute Monocytes 0.5      Absolute Eosinophils 0.0      Absolute Basophils 0.1      Absolute Immature Granulocytes 0.0      Absolute NRBCs 0.0         Imaging   CT Abdomen Pelvis w Contrast   Final Result   IMPRESSION:    1.  New mild wall thickening and dilatation of several loops of small bowel within the left aspect of the abdomen. This is likely secondary to enteritis of an infectious or inflammatory origin. No evidence for bowel obstruction.      Chest XR,  PA & LAT   Final  Result   IMPRESSION: Negative chest.          EKG   ECG results from 07/04/25   EKG 12 lead     Value    Systolic Blood Pressure     Diastolic Blood Pressure     Ventricular Rate 75    Atrial Rate 75    KY Interval 178    QRS Duration 80        QTc 433    P Axis 75    R AXIS 76    T Axis 53    Interpretation ECG      Sinus rhythm with sinus arrhythmia  Possible Left atrial enlargement  Borderline ECG  When compared with ECG of 02-Mar-2020 23:17,  No significant change was found           Independent Interpretation   None    ED Course      Medications Administered   Medications   ketorolac (TORADOL) injection 15 mg (15 mg Intravenous $Given 7/4/25 1001)   sodium chloride 0.9% BOLUS 1,000 mL (0 mLs Intravenous Stopped 7/4/25 1243)   iopamidol (ISOVUE-370) solution 500 mL (65 mLs Intravenous $Given 7/4/25 1104)   sodium chloride 0.9 % bag for CT scan flush (100 mLs Intravenous $Given 7/4/25 1104)       Procedures   Procedures     Discussion of Management   None    ED Course        Additional Documentation  None    Medical Decision Making / Diagnosis     CMS Diagnoses: None    MIPS   None               [facility]   Leo Gabriel is a 45 year old male who presents to the ER for evaluation of recent diarrhea and left-sided abdominal pain with some shortness of breath and referral pain to the left lower chest.  Vital signs reassuring.  Patient has some moderate left-sided abdominal tenderness without peritoneal signs.  Chest pain evaluation is negative for signs of pneumonia, pneumothorax, aortic dissection, PE, ACS.  Abdominal labs are reassuring but he does have evidence of enteritis on CT.  We discussed pursuing stool studies but decided to hold off at this time given lack of high fevers or leukocytosis or blood in the stool.  Discussed dietary modification and importance of oral fluids while he recovers.  Outpatient follow-up recommended.  Return precautions discussed.  Patient did report previous normal colonoscopy a  few years ago, but we discussed that if he continues to have abdominal symptoms, outpatient gastroenterology evaluation might be beneficial.    Disposition   The patient was discharged.     Diagnosis     ICD-10-CM    1. Enteritis  K52.9       2. Shortness of breath  R06.02            Discharge Medications   Discharge Medication List as of 7/4/2025 12:44 PM             John Seymour MD  07/04/25 9489

## 2025-07-04 NOTE — ED TRIAGE NOTES
Patient reports left sided flank pain for 1 week.  Patient states today he began having fatigue as well.  Patient denies nausea and vomiting.      Triage Assessment (Adult)       Row Name 07/04/25 0940          Triage Assessment    Airway WDL WDL        Respiratory WDL    Respiratory WDL WDL        Skin Circulation/Temperature WDL    Skin Circulation/Temperature WDL WDL        Cardiac WDL    Cardiac WDL WDL        Peripheral/Neurovascular WDL    Peripheral Neurovascular WDL WDL        Cognitive/Neuro/Behavioral WDL    Cognitive/Neuro/Behavioral WDL WDL

## 2025-07-07 LAB
ATRIAL RATE - MUSE: 75 BPM
DIASTOLIC BLOOD PRESSURE - MUSE: NORMAL MMHG
INTERPRETATION ECG - MUSE: NORMAL
P AXIS - MUSE: 75 DEGREES
PR INTERVAL - MUSE: 178 MS
QRS DURATION - MUSE: 80 MS
QT - MUSE: 388 MS
QTC - MUSE: 433 MS
R AXIS - MUSE: 76 DEGREES
SYSTOLIC BLOOD PRESSURE - MUSE: NORMAL MMHG
T AXIS - MUSE: 53 DEGREES
VENTRICULAR RATE- MUSE: 75 BPM

## 2025-07-15 DIAGNOSIS — E78.5 HYPERLIPIDEMIA LDL GOAL <100: ICD-10-CM

## 2025-07-15 RX ORDER — ROSUVASTATIN CALCIUM 10 MG/1
10 TABLET, COATED ORAL DAILY
Qty: 90 TABLET | Refills: 0 | Status: SHIPPED | OUTPATIENT
Start: 2025-07-15

## 2025-07-17 ENCOUNTER — OFFICE VISIT (OUTPATIENT)
Dept: FAMILY MEDICINE | Facility: CLINIC | Age: 45
End: 2025-07-17
Payer: COMMERCIAL

## 2025-07-17 VITALS
HEART RATE: 63 BPM | TEMPERATURE: 98.5 F | SYSTOLIC BLOOD PRESSURE: 114 MMHG | DIASTOLIC BLOOD PRESSURE: 78 MMHG | BODY MASS INDEX: 23.21 KG/M2 | OXYGEN SATURATION: 99 % | WEIGHT: 131 LBS | RESPIRATION RATE: 19 BRPM | HEIGHT: 63 IN

## 2025-07-17 DIAGNOSIS — R10.12 LEFT UPPER QUADRANT PAIN: ICD-10-CM

## 2025-07-17 DIAGNOSIS — R10.32 LEFT LOWER QUADRANT PAIN: Primary | ICD-10-CM

## 2025-07-17 DIAGNOSIS — Z83.79 FAMILY HISTORY OF CELIAC DISEASE: ICD-10-CM

## 2025-07-17 NOTE — PROGRESS NOTES
"  Assessment & Plan     Left lower quadrant pain  Left upper quadrant pain  Given ongoing intermittent pain recommend seeing GI for further evaluation on next steps. No concerning labs. Referral placed  - Adult GI  Referral - Consult Only; Future    Family history of celiac disease  Has 2 daughters with celiac and he tested negative in 2023. Discussed doing bloodwork today however pt states he doesn't eat gluten often. Will hold off for now to avoid potential false negative result. Possible previous lab was a false negative since he was not eating much gluten at the time. Will defer to GI if they would like to do a gluten challenge or not        MED REC REQUIRED  Post Medication Reconciliation Status: patient was not discharged from an inpatient facility or TCU      Subjective   Leo is a 45 year old, presenting for the following health issues:  ER F/U (Flank Pain)        7/17/2025     9:01 AM   Additional Questions   Roomed by Pablo GROVE CMA     HPI        ED/UC Followup:    Facility: [facility] Emergency Dept    Date of visit: 7/4/2025  Reason for visit: Flank Pain  Current Status: comes and goes, when made the appointment was hurting really bad but pain is mild now , this is the second ER visit within this year     Pain comes and goes   Entire left side of abdomen  No nausea or vomiting   No diarrhea or mucous   No blood in the stool  Occ Bloating- maybe 3x/month  Has 2 daughters with celiac disease. Eats mostly GF diet at home but will eat gluten outside of the home - not often though. Was tested for celiac in 2023 and was negative at the time, however he states he was not eating much gluten at the time          Objective    /78 (BP Location: Right arm, Patient Position: Sitting, Cuff Size: Adult Regular)   Pulse 63   Temp 98.5  F (36.9  C) (Oral)   Resp 19   Ht 1.6 m (5' 3\")   Wt 59.4 kg (131 lb)   SpO2 99%   BMI 23.21 kg/m    Body mass index is 23.21 kg/m .  Physical Exam "   GENERAL: alert and no distress  RESP: lungs clear to auscultation - no rales, rhonchi or wheezes  CV: regular rate and rhythm, normal S1 S2, no S3 or S4, no murmur, click or rub, no peripheral edema  ABDOMEN: tenderness LUQ, bowel sounds hyperactive  PSYCH: mentation appears normal, affect normal/bright              Signed Electronically by: SHERIE Spain CNP

## 2025-07-18 PROBLEM — D12.6 ADENOMATOUS POLYP OF COLON: Status: ACTIVE | Noted: 2025-07-18

## 2025-07-21 ENCOUNTER — PATIENT OUTREACH (OUTPATIENT)
Dept: GASTROENTEROLOGY | Facility: CLINIC | Age: 45
End: 2025-07-21
Payer: COMMERCIAL

## 2025-07-24 ENCOUNTER — PATIENT OUTREACH (OUTPATIENT)
Dept: CARE COORDINATION | Facility: CLINIC | Age: 45
End: 2025-07-24
Payer: COMMERCIAL

## 2025-08-04 ENCOUNTER — LAB (OUTPATIENT)
Dept: LAB | Facility: CLINIC | Age: 45
End: 2025-08-04
Payer: COMMERCIAL

## 2025-08-04 DIAGNOSIS — E78.00 PURE HYPERCHOLESTEROLEMIA: Primary | ICD-10-CM

## 2025-08-04 DIAGNOSIS — E78.5 HYPERLIPIDEMIA LDL GOAL <100: ICD-10-CM

## 2025-08-04 DIAGNOSIS — Z83.438 FAMILY HISTORY OF HYPERLIPIDEMIA: ICD-10-CM

## 2025-08-04 LAB
ALBUMIN SERPL BCG-MCNC: 5 G/DL (ref 3.5–5.2)
ALP SERPL-CCNC: 63 U/L (ref 40–150)
ALT SERPL W P-5'-P-CCNC: 23 U/L (ref 0–70)
ANION GAP SERPL CALCULATED.3IONS-SCNC: 13 MMOL/L (ref 7–15)
APO A-I SERPL-MCNC: 18 MG/DL
AST SERPL W P-5'-P-CCNC: 28 U/L (ref 0–45)
BILIRUB SERPL-MCNC: 0.4 MG/DL
BUN SERPL-MCNC: 9.8 MG/DL (ref 6–20)
CALCIUM SERPL-MCNC: 10.4 MG/DL (ref 8.8–10.4)
CHLORIDE SERPL-SCNC: 103 MMOL/L (ref 98–107)
CHOLEST SERPL-MCNC: 168 MG/DL
CREAT SERPL-MCNC: 1.02 MG/DL (ref 0.67–1.17)
CRP SERPL HS-MCNC: 0.23 MG/L
EGFRCR SERPLBLD CKD-EPI 2021: >90 ML/MIN/1.73M2
FASTING STATUS PATIENT QL REPORTED: YES
FASTING STATUS PATIENT QL REPORTED: YES
GLUCOSE SERPL-MCNC: 112 MG/DL (ref 70–99)
HCO3 SERPL-SCNC: 26 MMOL/L (ref 22–29)
HDLC SERPL-MCNC: 61 MG/DL
LDLC SERPL CALC-MCNC: 87 MG/DL
NONHDLC SERPL-MCNC: 107 MG/DL
POTASSIUM SERPL-SCNC: 4.5 MMOL/L (ref 3.4–5.3)
PROT SERPL-MCNC: 7.9 G/DL (ref 6.4–8.3)
SODIUM SERPL-SCNC: 142 MMOL/L (ref 135–145)
TRIGL SERPL-MCNC: 100 MG/DL
TSH SERPL DL<=0.005 MIU/L-ACNC: 1.44 UIU/ML (ref 0.3–4.2)

## 2025-08-04 PROCEDURE — 84443 ASSAY THYROID STIM HORMONE: CPT

## 2025-08-04 PROCEDURE — 80053 COMPREHEN METABOLIC PANEL: CPT

## 2025-08-04 PROCEDURE — 86141 C-REACTIVE PROTEIN HS: CPT

## 2025-08-04 PROCEDURE — 80061 LIPID PANEL: CPT

## 2025-08-04 PROCEDURE — 82172 ASSAY OF APOLIPOPROTEIN: CPT | Mod: 90

## 2025-08-04 PROCEDURE — 83695 ASSAY OF LIPOPROTEIN(A): CPT

## 2025-08-04 PROCEDURE — 99000 SPECIMEN HANDLING OFFICE-LAB: CPT

## 2025-08-04 PROCEDURE — 36415 COLL VENOUS BLD VENIPUNCTURE: CPT

## 2025-08-05 LAB — APO B100 SERPL-MCNC: 79 MG/DL

## 2025-08-06 ENCOUNTER — OFFICE VISIT (OUTPATIENT)
Dept: GASTROENTEROLOGY | Facility: CLINIC | Age: 45
End: 2025-08-06
Payer: COMMERCIAL

## 2025-08-06 VITALS
BODY MASS INDEX: 23.57 KG/M2 | SYSTOLIC BLOOD PRESSURE: 106 MMHG | OXYGEN SATURATION: 98 % | HEART RATE: 57 BPM | WEIGHT: 133 LBS | DIASTOLIC BLOOD PRESSURE: 69 MMHG | HEIGHT: 63 IN

## 2025-08-06 DIAGNOSIS — R10.32 LEFT LOWER QUADRANT PAIN: Primary | ICD-10-CM

## 2025-08-06 DIAGNOSIS — Z83.79 FAMILY HISTORY OF CELIAC DISEASE: ICD-10-CM

## 2025-08-06 DIAGNOSIS — R19.7 INTERMITTENT DIARRHEA: ICD-10-CM

## 2025-08-06 DIAGNOSIS — R10.12 LEFT UPPER QUADRANT PAIN: ICD-10-CM

## 2025-08-06 ASSESSMENT — PAIN SCALES - GENERAL: PAINLEVEL_OUTOF10: NO PAIN (0)

## 2025-08-07 LAB
GLIADIN IGA SER-ACNC: 1.1 U/ML
GLIADIN IGG SER-ACNC: <0.6 U/ML
IGA SERPL-MCNC: 241 MG/DL (ref 84–499)
TTG IGA SER-ACNC: 0.4 U/ML
TTG IGG SER-ACNC: <0.6 U/ML

## 2025-08-19 ENCOUNTER — VIRTUAL VISIT (OUTPATIENT)
Dept: CARDIOLOGY | Facility: CLINIC | Age: 45
End: 2025-08-19
Attending: INTERNAL MEDICINE
Payer: COMMERCIAL

## 2025-08-19 DIAGNOSIS — Z83.438 FAMILY HISTORY OF HYPERLIPIDEMIA: ICD-10-CM

## 2025-08-19 DIAGNOSIS — E78.5 HYPERLIPIDEMIA LDL GOAL <100: ICD-10-CM

## 2025-08-19 PROCEDURE — 98004 SYNCH AUDIO-VIDEO EST SF 10: CPT | Performed by: INTERNAL MEDICINE

## 2025-08-19 RX ORDER — ROSUVASTATIN CALCIUM 10 MG/1
10 TABLET, COATED ORAL DAILY
Qty: 100 TABLET | Refills: 5 | Status: SHIPPED | OUTPATIENT
Start: 2025-08-19

## 2025-08-21 ENCOUNTER — TELEPHONE (OUTPATIENT)
Dept: GASTROENTEROLOGY | Facility: CLINIC | Age: 45
End: 2025-08-21
Payer: COMMERCIAL